# Patient Record
Sex: FEMALE | Race: WHITE | NOT HISPANIC OR LATINO | URBAN - METROPOLITAN AREA
[De-identification: names, ages, dates, MRNs, and addresses within clinical notes are randomized per-mention and may not be internally consistent; named-entity substitution may affect disease eponyms.]

---

## 2017-02-27 DIAGNOSIS — G31.01 PRIMARY PROGRESSIVE APHASIA (HCC): ICD-10-CM

## 2017-02-27 DIAGNOSIS — F02.80 PRIMARY PROGRESSIVE APHASIA (HCC): ICD-10-CM

## 2017-02-27 RX ORDER — MEMANTINE HYDROCHLORIDE 28 MG/1
28 CAPSULE, EXTENDED RELEASE ORAL DAILY
Qty: 90 CAP | Refills: 3 | Status: SHIPPED | OUTPATIENT
Start: 2017-02-27 | End: 2017-04-20 | Stop reason: SDUPTHER

## 2017-04-20 ENCOUNTER — OFFICE VISIT (OUTPATIENT)
Dept: NEUROLOGY | Facility: MEDICAL CENTER | Age: 67
End: 2017-04-20
Payer: COMMERCIAL

## 2017-04-20 VITALS
DIASTOLIC BLOOD PRESSURE: 82 MMHG | HEIGHT: 65 IN | WEIGHT: 198 LBS | HEART RATE: 83 BPM | SYSTOLIC BLOOD PRESSURE: 130 MMHG | TEMPERATURE: 96.6 F | BODY MASS INDEX: 32.99 KG/M2 | OXYGEN SATURATION: 98 % | RESPIRATION RATE: 16 BRPM

## 2017-04-20 DIAGNOSIS — F02.80 PRIMARY PROGRESSIVE APHASIA (HCC): ICD-10-CM

## 2017-04-20 DIAGNOSIS — G31.01 PRIMARY PROGRESSIVE APHASIA (HCC): ICD-10-CM

## 2017-04-20 PROCEDURE — 99213 OFFICE O/P EST LOW 20 MIN: CPT | Performed by: PSYCHIATRY & NEUROLOGY

## 2017-04-20 RX ORDER — MEMANTINE HYDROCHLORIDE 28 MG/1
28 CAPSULE, EXTENDED RELEASE ORAL DAILY
Qty: 90 CAP | Refills: 3 | Status: SHIPPED | OUTPATIENT
Start: 2017-04-20 | End: 2023-09-12 | Stop reason: SDUPTHER

## 2017-04-20 RX ORDER — DULOXETIN HYDROCHLORIDE 60 MG/1
60 CAPSULE, DELAYED RELEASE ORAL DAILY
Qty: 90 CAP | Refills: 3 | Status: SHIPPED | OUTPATIENT
Start: 2017-04-20 | End: 2023-09-12

## 2017-04-20 RX ORDER — DONEPEZIL HYDROCHLORIDE 23 MG/1
1 TABLET, FILM COATED ORAL DAILY
Qty: 90 TAB | Refills: 3 | Status: SHIPPED | OUTPATIENT
Start: 2017-04-20 | End: 2023-09-12

## 2017-04-20 NOTE — MR AVS SNAPSHOT
"Mindy Carvajal   2017 10:00 AM   Office Visit   MRN: [id number]    Department:  [facility]   Dept Phone:  [phone]    Description:  Female : 1950   Provider:  Tulio Gold M.D.           Reason for Visit     Follow-Up aphasia      Allergies as of 2017     No Known Allergies      You were diagnosed with     Primary progressive aphasia   [[id number]]         Vital Signs     Blood Pressure Pulse Temperature Respirations Height Weight    130/82 mmHg 83 35.9 °C (96.6 °F) 16 1.651 m (5' 5\") 89.812 kg (198 lb)    Body Mass Index Oxygen Saturation Smoking Status             32.95 kg/m2 98% Former Smoker         Basic Information     Date Of Birth Sex Race Ethnicity Preferred Language    1950 Female White Non- English      Problem List              ICD-10-CM Priority Class Noted - Resolved    Primary progressive aphasia G31.01, F02.80   Unknown - Present      Health Maintenance        Date Due Completion Dates    IMM DTaP/Tdap/Td Vaccine (1 - Tdap) 1971 ---    PAP SMEAR 1973 ---    COLONOSCOPY 2002 ---    IMM ZOSTER VACCINE 2012 ---    MAMMOGRAM 3/1/2013 3/1/2012, 2011, 2011, 2008, 2008            Current Immunizations     No immunizations on file.      Below and/or attached are the medications your provider expects you to take. Review all of your home medications and newly ordered medications with your provider and/or pharmacist. Follow medication instructions as directed by your provider and/or pharmacist. Please keep your medication list with you and share with your provider. Update the information when medications are discontinued, doses are changed, or new medications (including over-the-counter products) are added; and carry medication information at all times in the event of emergency situations     Allergies:  No Known Allergies          Medications  Valid as of: 2017 - 10:13 AM    Generic Name Brand Name " Tablet Size Instructions for use    Cholecalciferol (Tab) cholecalciferol 1000 UNIT Take 1,000 Units by mouth every day.        Donepezil HCl (Tab) Donepezil HCl 23 MG Take 1 Tab by mouth every day.        DULoxetine HCl (Cap DR Particles) CYMBALTA 60 MG Take 1 Cap by mouth every day.        L-Methylfolate-L49-[id number]   Take  by mouth.        Lovastatin (Tab) MEVACOR 40 MG Take 1 Tab by mouth every evening.        Memantine HCl (CAPSULE SR 24 HR) NAMENDA 28 MG Take 1 Cap by mouth every day.        Simvastatin (Tab) ZOCOR 40 MG Take 40 mg by mouth every evening.        Thyroid   Take 1 g by mouth.        .                 Medicines prescribed today were sent to:     [id number], [place] - [address] [address]    Phone: [phone] Fax: [phone]    Open 24 Hours?: No      Medication refill instructions:       If your prescription bottle indicates you have medication refills left, it is not necessary to call your provider’s office. Please contact your pharmacy and they will refill your medication.    If your prescription bottle indicates you do not have any refills left, you may request refills at any time through one of the following ways: The online [employer] system (except Urgent Care), by calling your provider’s office, or by asking your pharmacy to contact your provider’s office with a refill request. Medication refills are processed only during regular business hours and may not be available until the next business day. Your provider may request additional information or to have a follow-up visit with you prior to refilling your medication.   *Please Note: Medication refills are assigned a new Rx number when refilled electronically. Your pharmacy may indicate that no refills were authorized even though a new prescription for the same medication is available at the pharmacy. Please request the medicine by name with the pharmacy before contacting your provider  for a refill.           [employer] Access Code: [email or web address]  Expires: 5/20/2017 10:13 AM    Your email address is not on file at [facility].  Email Addresses are required for you to sign up for [employer], please contact [phone] to verify your personal information and to provide your email address prior to attempting to register for [employer].    Mindy Carvajal  [address] [place] [address]    [employer]  A secure, online tool to manage your health information     [facility]’s [employer]® is a secure, online tool that connects you to your personalized health information from the privacy of your home -- day or night - making it very easy for you to manage your healthcare. Once the activation process is completed, you can even access your medical information using the [employer] lindsey, which is available for free in the Apple Lindsey store or Google Play store.     To learn more about [employer], visit [email or web address]    There are two levels of access available (as shown below):   My Chart Features  [facility] Primary Care Doctor [facility]  Specialists [facility]  Urgent  Care Non-[facility] Primary Care Doctor   Email your healthcare team securely and privately 24/7 X X X    Manage appointments: schedule your next appointment; view details of past/upcoming appointments X      Request prescription refills. X      View recent personal medical records, including lab and immunizations X X X X   View health record, including health history, allergies, medications X X X X   Read reports about your outpatient visits, procedures, consult and ER notes X X X X   See your discharge summary, which is a recap of your hospital and/or ER visit that includes your diagnosis, lab results, and care plan X X  X     How to register for [employer]:  Once your e-mail address has been verified, follow the following steps to sign up for [employer].     1. Go to  [email or web address]  2. Click on the Sign Up Now box, which takes you to the New  Member Sign Up page. You will need to provide the following information:  a. Enter your [facility] Access Code exactly as it appears at the top of this page. (You will not need to use this code after you’ve completed the sign-up process. If you do not sign up before the expiration date, you must request a new code.)   b. Enter your date of birth.   c. Enter your home email address.   d. Click Submit, and follow the next screen’s instructions.  3. Create a [employer] ID. This will be your [facility] login ID and cannot be changed, so think of one that is secure and easy to remember.  4. Create a [facility] password. You can change your password at any time.  5. Enter your Password Reset Question and Answer. This can be used at a later time if you forget your password.   6. Enter your e-mail address. This allows you to receive e-mail notifications when new information is available in [facility].  7. Click Sign Up. You can now view your health information.    For assistance activating your [facility] account, call [phone]

## 2017-04-20 NOTE — PROGRESS NOTES
"Subjective:      Mindy Carvajal is a 66 y.o. female who presents with her  Andry, who provides the history, with a history of primary progressive aphasia.    HPI    Mindy has continued to do well. The language deficits again limit her ability to interact with others, but she still is very pleasant in severe and demeanor, there are no inappropriate behaviors demonstrated. She remains independent with her ADLs for the most part, there can be some assistance required from Andry. Walking has not been affected, she does not fall, bowel and bladder functions are maintained. She's had no episodes of loss of consciousness or seizure activity. She is sleeping well. Appetite and weight are stable. Speech output is still limited, a lot of paraphasic errors are used, she seemed to get easily flustered, but when more relaxed, she actually does fairly well.    Medical, surgical and family histories are reviewed, there are no new drug allergies. She did finally find a PCP, her Synthroid was renewed, unfortunately they're having a bit of a vilchis with a pulmonary specialist for her CPAP durable medical fixtures. I told Andry he needs to follow-up with his primary care physician in this regard.    She is on Cymbalta 60 mg daily, Aricept 23 mg daily and Namenda XR 20 mg daily, Zocor, vitamin D and calcium supplements and a B complex.    Review of Systems   Unable to perform ROS: dementia        Objective:     /82 mmHg  Pulse 83  Temp(Src) 35.9 °C (96.6 °F)  Resp 16  Ht 1.651 m (5' 5\")  Wt 89.812 kg (198 lb)  BMI 32.95 kg/m2  SpO2 98%     Physical Exam    She appears no acute distress. Her vital signs are stable. There is no malar rash. The neck is supple. Cardiac evaluation reveals a regular rhythm. The mixed aphasia is unchanged, speech production more severely curtailed though both verbal and visual comprehension are also impacted. Paraphasic errors again are used, but she can name simple objects " when pointed to them by the examiner. Interestingly, repetition of written sentences seems to be a little easier than repeating spoken words. There is no apraxia with automatic tasks such as brushing her teeth, but she has difficulty following the examiner's request to perform finger-to-nose testing. There is no inattention. Otherwise, cranial nerve, motor, coordination, and sensory evaluations are otherwise fully intact.     Assessment/Plan:     1. Primary progressive aphasia  We will continue her present regimen unchanged, Andry thinks there may be some slight progression, she certainly seems to be doing well today when compared to her previous evaluation 6 months ago. She certainly has not deteriorated remarkably. I considered the follow-up with her in one year. Andry understands I will be writing for the Namenda, Aricept and Cymbalta for the immediate future, all other medications and DME for her breathing apparatus need to come from her PCPs office.    - Memantine HCl ER (NAMENDA XR) 28 MG CAPSULE SR 24 HR; Take 1 Cap by mouth every day.  Dispense: 90 Cap; Refill: 3  - Donepezil HCl (ARICEPT) 23 MG Tab; Take 1 Tab by mouth every day.  Dispense: 90 Tab; Refill: 3  - duloxetine (CYMBALTA) 60 MG Cap DR Particles delayed-release capsule; Take 1 Cap by mouth every day.  Dispense: 90 Cap; Refill: 3    Time: Evaluation of 20 minutes for exam come review, discussion, and education  Discussion: As mentioned in the assessment, over 50% of the time spent face-to-face counseling and correlating care

## 2017-05-17 ENCOUNTER — HOSPITAL ENCOUNTER (OUTPATIENT)
Dept: LAB | Facility: MEDICAL CENTER | Age: 67
End: 2017-05-17
Attending: NURSE PRACTITIONER
Payer: COMMERCIAL

## 2017-05-17 LAB
EST. AVERAGE GLUCOSE BLD GHB EST-MCNC: 140 MG/DL
HBA1C MFR BLD: 6.5 % (ref 0–5.6)

## 2017-05-17 PROCEDURE — 83036 HEMOGLOBIN GLYCOSYLATED A1C: CPT

## 2017-05-17 PROCEDURE — 36415 COLL VENOUS BLD VENIPUNCTURE: CPT

## 2017-05-20 ENCOUNTER — HOSPITAL ENCOUNTER (OUTPATIENT)
Dept: LAB | Facility: MEDICAL CENTER | Age: 67
End: 2017-05-20
Attending: FAMILY MEDICINE
Payer: COMMERCIAL

## 2017-05-20 LAB
ALBUMIN SERPL BCP-MCNC: 4.3 G/DL (ref 3.2–4.9)
ALBUMIN/GLOB SERPL: 1.4 G/DL
ALP SERPL-CCNC: 70 U/L (ref 30–99)
ALT SERPL-CCNC: 32 U/L (ref 2–50)
ANION GAP SERPL CALC-SCNC: 10 MMOL/L (ref 0–11.9)
APPEARANCE UR: CLEAR
AST SERPL-CCNC: 22 U/L (ref 12–45)
BILIRUB SERPL-MCNC: 0.5 MG/DL (ref 0.1–1.5)
BILIRUB UR QL STRIP.AUTO: NEGATIVE
BUN SERPL-MCNC: 13 MG/DL (ref 8–22)
CALCIUM SERPL-MCNC: 9.5 MG/DL (ref 8.5–10.5)
CHLORIDE SERPL-SCNC: 104 MMOL/L (ref 96–112)
CO2 SERPL-SCNC: 26 MMOL/L (ref 20–33)
COLOR UR: YELLOW
CREAT SERPL-MCNC: 0.86 MG/DL (ref 0.5–1.4)
GFR SERPL CREATININE-BSD FRML MDRD: >60 ML/MIN/1.73 M 2
GLOBULIN SER CALC-MCNC: 3 G/DL (ref 1.9–3.5)
GLUCOSE SERPL-MCNC: 95 MG/DL (ref 65–99)
GLUCOSE UR STRIP.AUTO-MCNC: NEGATIVE MG/DL
KETONES UR STRIP.AUTO-MCNC: NEGATIVE MG/DL
LEUKOCYTE ESTERASE UR QL STRIP.AUTO: NEGATIVE
MICRO URNS: NORMAL
NITRITE UR QL STRIP.AUTO: NEGATIVE
PH UR STRIP.AUTO: 6 [PH]
POTASSIUM SERPL-SCNC: 4.3 MMOL/L (ref 3.6–5.5)
PROT SERPL-MCNC: 7.3 G/DL (ref 6–8.2)
PROT UR QL STRIP: NEGATIVE MG/DL
RBC UR QL AUTO: NEGATIVE
SODIUM SERPL-SCNC: 140 MMOL/L (ref 135–145)
SP GR UR STRIP.AUTO: 1.01
TSH SERPL DL<=0.005 MIU/L-ACNC: 0.38 UIU/ML (ref 0.3–3.7)

## 2017-05-20 PROCEDURE — 80053 COMPREHEN METABOLIC PANEL: CPT

## 2017-05-20 PROCEDURE — 84443 ASSAY THYROID STIM HORMONE: CPT

## 2017-05-20 PROCEDURE — 81003 URINALYSIS AUTO W/O SCOPE: CPT

## 2017-05-20 PROCEDURE — 36415 COLL VENOUS BLD VENIPUNCTURE: CPT

## 2018-06-30 ENCOUNTER — HOSPITAL ENCOUNTER (OUTPATIENT)
Dept: RADIOLOGY | Facility: MEDICAL CENTER | Age: 68
End: 2018-06-30
Attending: FAMILY MEDICINE
Payer: COMMERCIAL

## 2018-06-30 DIAGNOSIS — Z12.31 VISIT FOR SCREENING MAMMOGRAM: ICD-10-CM

## 2018-06-30 PROCEDURE — 77067 SCR MAMMO BI INCL CAD: CPT

## 2018-07-02 ENCOUNTER — HOSPITAL ENCOUNTER (OUTPATIENT)
Dept: LAB | Facility: MEDICAL CENTER | Age: 68
End: 2018-07-02
Attending: FAMILY MEDICINE
Payer: COMMERCIAL

## 2018-07-02 LAB
ALBUMIN SERPL BCP-MCNC: 4.5 G/DL (ref 3.2–4.9)
ALBUMIN/GLOB SERPL: 1.4 G/DL
ALP SERPL-CCNC: 67 U/L (ref 30–99)
ALT SERPL-CCNC: 40 U/L (ref 2–50)
ANION GAP SERPL CALC-SCNC: 11 MMOL/L (ref 0–11.9)
AST SERPL-CCNC: 27 U/L (ref 12–45)
BILIRUB SERPL-MCNC: 0.5 MG/DL (ref 0.1–1.5)
BUN SERPL-MCNC: 16 MG/DL (ref 8–22)
CALCIUM SERPL-MCNC: 9.8 MG/DL (ref 8.5–10.5)
CHLORIDE SERPL-SCNC: 106 MMOL/L (ref 96–112)
CHOLEST SERPL-MCNC: 238 MG/DL (ref 100–199)
CO2 SERPL-SCNC: 24 MMOL/L (ref 20–33)
CREAT SERPL-MCNC: 1.05 MG/DL (ref 0.5–1.4)
GLOBULIN SER CALC-MCNC: 3.2 G/DL (ref 1.9–3.5)
GLUCOSE SERPL-MCNC: 126 MG/DL (ref 65–99)
HDLC SERPL-MCNC: 58 MG/DL
LDLC SERPL CALC-MCNC: ABNORMAL MG/DL
POTASSIUM SERPL-SCNC: 4.1 MMOL/L (ref 3.6–5.5)
PROT SERPL-MCNC: 7.7 G/DL (ref 6–8.2)
SODIUM SERPL-SCNC: 141 MMOL/L (ref 135–145)
T4 SERPL-MCNC: 4.6 UG/DL (ref 4–12)
TRIGL SERPL-MCNC: 440 MG/DL (ref 0–149)
TSH SERPL DL<=0.005 MIU/L-ACNC: 0.66 UIU/ML (ref 0.38–5.33)

## 2018-07-02 PROCEDURE — 80053 COMPREHEN METABOLIC PANEL: CPT

## 2018-07-02 PROCEDURE — 84436 ASSAY OF TOTAL THYROXINE: CPT

## 2018-07-02 PROCEDURE — 80061 LIPID PANEL: CPT

## 2018-07-02 PROCEDURE — 36415 COLL VENOUS BLD VENIPUNCTURE: CPT

## 2018-07-02 PROCEDURE — 84443 ASSAY THYROID STIM HORMONE: CPT

## 2018-10-13 ENCOUNTER — HOSPITAL ENCOUNTER (OUTPATIENT)
Dept: LAB | Facility: MEDICAL CENTER | Age: 68
End: 2018-10-13
Attending: FAMILY MEDICINE
Payer: COMMERCIAL

## 2018-10-13 LAB
CHOLEST SERPL-MCNC: 186 MG/DL (ref 100–199)
FASTING STATUS PATIENT QL REPORTED: NORMAL
HDLC SERPL-MCNC: 58 MG/DL
LDLC SERPL CALC-MCNC: 68 MG/DL
TRIGL SERPL-MCNC: 300 MG/DL (ref 0–149)

## 2018-10-13 PROCEDURE — 36415 COLL VENOUS BLD VENIPUNCTURE: CPT

## 2018-10-13 PROCEDURE — 80061 LIPID PANEL: CPT

## 2019-10-11 ENCOUNTER — HOSPITAL ENCOUNTER (OUTPATIENT)
Facility: MEDICAL CENTER | Age: 69
End: 2019-10-11
Attending: FAMILY MEDICINE
Payer: MEDICARE

## 2019-10-11 ENCOUNTER — HOSPITAL ENCOUNTER (OUTPATIENT)
Dept: LAB | Facility: MEDICAL CENTER | Age: 69
End: 2019-10-11
Attending: FAMILY MEDICINE
Payer: MEDICARE

## 2019-10-11 LAB
APPEARANCE UR: CLEAR
BACTERIA #/AREA URNS HPF: ABNORMAL /HPF
BILIRUB UR QL STRIP.AUTO: NEGATIVE
CAOX CRY #/AREA URNS HPF: ABNORMAL /HPF
COLOR UR: YELLOW
EPI CELLS #/AREA URNS HPF: ABNORMAL /HPF
GLUCOSE UR STRIP.AUTO-MCNC: NEGATIVE MG/DL
HYALINE CASTS #/AREA URNS LPF: ABNORMAL /LPF
KETONES UR STRIP.AUTO-MCNC: NEGATIVE MG/DL
LEUKOCYTE ESTERASE UR QL STRIP.AUTO: ABNORMAL
MICRO URNS: ABNORMAL
MUCOUS THREADS #/AREA URNS HPF: ABNORMAL /HPF
NITRITE UR QL STRIP.AUTO: POSITIVE
PH UR STRIP.AUTO: 5 [PH] (ref 5–8)
PROT UR QL STRIP: NEGATIVE MG/DL
RBC # URNS HPF: ABNORMAL /HPF
RBC UR QL AUTO: NEGATIVE
SP GR UR STRIP.AUTO: 1.02
UROBILINOGEN UR STRIP.AUTO-MCNC: 0.2 MG/DL
WBC #/AREA URNS HPF: ABNORMAL /HPF

## 2019-10-11 PROCEDURE — 81001 URINALYSIS AUTO W/SCOPE: CPT

## 2019-11-19 PROBLEM — Z23 NEED FOR INFLUENZA VACCINATION: Status: ACTIVE | Noted: 2019-11-19

## 2019-11-19 PROBLEM — G47.33 OSA (OBSTRUCTIVE SLEEP APNEA): Status: ACTIVE | Noted: 2019-11-19

## 2019-11-19 PROBLEM — Z87.891 FORMER SMOKER: Status: ACTIVE | Noted: 2019-11-19

## 2019-11-19 NOTE — PROGRESS NOTES
"CC: Establish care for previously diagnosed sleep apnea: \"Authorization to adjust CPAP\"    HPI:    Ms. Mindy Carvajal is a 69-year-old kindly referred by Neema Mora, PhD., A.P.N. for evaluation of obstructive sleep apnea syndrome.  She was originally diagnosed with Dr. Solorzano in 2013 and was subsequently seen by [facility] on AutoPap 8 to 14 cm water  Her AHI was 10.5 with a lulu saturation of 86%.  She has not been able to use her CPAP because of the perception of excessive pressure.  She is accompanied by her  who supplies the history as she has primary progressive aphasia she has not used her machine in 6 months or so.  \"CPAP was too hard, makes it hard to exhale\".    Her total Belden score is a normal for out of 24.    Her last sleep study was done 7/23/2014.    She generally goes to bed at 8 PM and gets up at 7 AM.  She may watch TV just prior to turn out the lights and attempting to go to sleep.  Her sleep latency is about 20 minutes.    Symptoms include sometimes difficulty awakening and getting out of bed after sleeping and napping or returning to bed after arising.  She snores loudly according to her .  Her  is noted leg or arm jerking or twitching occasionally during sleep.  He has not noticed other significant issues    Significant comorbidities and modifying factors include primary progressive aphasia, hypothyroidism, dyslipidemia, former smoker, and up-to-date with influenza vaccination.    Patient Active Problem List    Diagnosis Date Noted   • EM (obstructive sleep apnea) 11/19/2019   • Former smoker 11/19/2019   • Need for influenza vaccination 11/19/2019   • Primary progressive aphasia (HCC)        Past Medical History:   Diagnosis Date   • Dyslipidemia    • Hypothyroid    • Primary progressive aphasia (HCC)    • Sleep apnea         Past Surgical History:   Procedure Laterality Date   • PRIMARY C SECTION         Family History   Problem Relation Age of Onset   • Cancer " Mother         leukemia   • Cancer Father         colon, age 80       Social History     Socioeconomic History   • Marital status:      Spouse name: Not on file   • Number of children: Not on file   • Years of education: Not on file   • Highest education level: Not on file   Occupational History   • Not on file   Social Needs   • Financial resource strain: Not on file   • Food insecurity:     Worry: Not on file     Inability: Not on file   • Transportation needs:     Medical: Not on file     Non-medical: Not on file   Tobacco Use   • Smoking status: Never Smoker   • Smokeless tobacco: Never Used   • Tobacco comment: 30 years ago   Substance and Sexual Activity   • Alcohol use: Yes     Alcohol/week: 0.0 oz     Comment: occasionally   • Drug use: No   • Sexual activity: Not Currently     Partners: Male     Comment: , 1 child, teacher   Lifestyle   • Physical activity:     Days per week: Not on file     Minutes per session: Not on file   • Stress: Not on file   Relationships   • Social connections:     Talks on phone: Not on file     Gets together: Not on file     Attends Voodoo service: Not on file     Active member of club or organization: Not on file     Attends meetings of clubs or organizations: Not on file     Relationship status: Not on file   • Intimate partner violence:     Fear of current or ex partner: Not on file     Emotionally abused: Not on file     Physically abused: Not on file     Forced sexual activity: Not on file   Other Topics Concern   • Not on file   Social History Narrative   • Not on file       Current Outpatient Medications   Medication Sig Dispense Refill   • donepezil (ARICEPT) 10 MG tablet Take 23 mg by mouth.     • QUEtiapine (SEROQUEL) 25 MG Tab Take 25 mg by mouth 2 times a day.     • mirtazapine (REMERON) 30 MG Tab tablet Take 30 mg by mouth every evening.     • duloxetine (CYMBALTA) 60 MG Cap DR Particles delayed-release capsule Take 1 Cap by mouth every day. 90 Cap 3  "  • THYROID PO Take 60 mg by mouth. ARMOUR THYROID     • L-Methylfolate-E61-H8-V6 (CEREFOLIN PO) Take  by mouth.     • simvastatin (ZOCOR) 40 MG Tab Take 40 mg by mouth every evening.     • DULoxetine (CYMBALTA) 30 MG Cap DR Particles Take 30 mg by mouth.     • Memantine HCl ER (NAMENDA XR) 28 MG CAPSULE SR 24 HR Take 1 Cap by mouth every day. 90 Cap 3   • Donepezil HCl (ARICEPT) 23 MG Tab Take 1 Tab by mouth every day. (Patient not taking: Reported on 11/20/2019) 90 Tab 3   • vitamin D (CHOLECALCIFEROL) 1000 UNIT Tab Take 1,000 Units by mouth every day.     • lovastatin (MEVACOR) 40 MG tablet Take 1 Tab by mouth every evening. (Patient not taking: Reported on 10/20/2016) 30 Each 3     No current facility-administered medications for this visit.     \"CURRENT RX\"    ALLERGIES: Patient has no known allergies.    ROS    Constitutional: Denies fever, chills, sweats,  weight loss, fatigue.  Eyes: Denies vision loss, pain, drainage, double vision, wears glasses.  Ears/Nose/Mouth/Throat: Denies earache, difficulty hearing, rhinitis/nasal congestion, injury, recurrent sore throat, persistent hoarseness, decayed teeth/toothaches, ringing or buzzing in the ears.  Cardiovascular: Denies chest pain, tightness, palpitations, swelling in legs/feet, fainting, difficulty breathing when lying down but gets better when sitting up.   Respiratory: Denies shortness of breath, cough, sputum, wheezing, painful breathing, coughing up blood.   Sleep: per HPI  Gastrointestinal: Denies  difficulty swallowing, nausea, abdominal pain, diarrhea, constipation, heartburn.  Genitourinary: Denies  blood in urine, discharge, frequent urination.   Musculoskeletal: Denies painful joints, sore muscles, back pain.   Integumentary: Denies rashes, lumps, color changes.   Neurological: Denies frequent headaches,weakness, dizziness.    PHYSICAL EXAM  Obese    /88 (BP Location: Left arm, Patient Position: Sitting, BP Cuff Size: Large adult)   Pulse 96  " " Resp 16   Ht 1.702 m (5' 7\")   Wt 98.4 kg (217 lb)   SpO2 95%   BMI 33.99 kg/m²   Appearance: Well-nourished, well-developed, no acute distress  Eyes:  PERRLA, EOMI  ENMT: without lesions, deformities;hearing grossly intact; tongue normal, posterior pharynx without erythema or exudate;   Modified Mallampati (AKA Szymanski) Score: 4  Neck: Supple, trachea midline, no masses  Respiratory effort:  No intercostal retractions or use of accessory muscles  Lung auscultation:  No wheezes rhonchi rubs or rales  Cardiac: No murmurs, rubs, or gallops; regular rhythm, normal rate; no edema  Abdomen:  No tenderness, no organomegaly.  Obese.  Musculoskeletal:  Grossly normal; gait and station normal; digits and nails normal  Skin:  No rashes, petechiae, cyanosis  Neurologic: limited speech  Psychiatric:  No depression, anxiety, agitation        PROBLEMS:  1. EM (obstructive sleep apnea)    - Overnight Home Sleep Study; Future    2. Primary progressive aphasia (HCC)      3. Former smoker      4. Need for influenza vaccination      5. Class 2 obesity with body mass index (BMI) of 36.0 to 36.9 in adult, unspecified obesity type, unspecified whether serious comorbidity present    - OBESITY COUNSELING (No Charge): Patient identified as having weight management issue.  Appropriate orders and counseling given.      PLAN:   Her  feels that the machine is giving her unusually high pressures.  Unfortunately the company that provided the unit is no longer in business.  He will bring the machine by for us to check to see if it set on the appropriate pressures.  If so then she has become intolerant to auto titrating CPAP 8 to 14 cm H2O.  Our options at this point would be to empirically place her on a lower auto titrating CPAP or perhaps simply a lower fixed pressure.    We will do a home sleep study off of Pap to reverify and restage her EM.  This will allow us to get her a new machine if she and her  choose to go this " route versus a re-titration versus empiric therapy.  Given the patient's neurologic dysfunction, her  is trying to keep things simple as possible for her.  Therefore they may opt for empiric changes rather than an in lab re-titration.          Return for after sleep study.

## 2019-11-20 ENCOUNTER — SLEEP CENTER VISIT (OUTPATIENT)
Dept: SLEEP MEDICINE | Facility: MEDICAL CENTER | Age: 69
End: 2019-11-20
Payer: MEDICARE

## 2019-11-20 VITALS
DIASTOLIC BLOOD PRESSURE: 88 MMHG | SYSTOLIC BLOOD PRESSURE: 120 MMHG | RESPIRATION RATE: 16 BRPM | OXYGEN SATURATION: 95 % | BODY MASS INDEX: 34.06 KG/M2 | HEIGHT: 67 IN | WEIGHT: 217 LBS | HEART RATE: 96 BPM

## 2019-11-20 DIAGNOSIS — Z87.891 FORMER SMOKER: ICD-10-CM

## 2019-11-20 DIAGNOSIS — F02.80 PRIMARY PROGRESSIVE APHASIA (HCC): ICD-10-CM

## 2019-11-20 DIAGNOSIS — G31.01 PRIMARY PROGRESSIVE APHASIA (HCC): ICD-10-CM

## 2019-11-20 DIAGNOSIS — E66.9 CLASS 2 OBESITY WITH BODY MASS INDEX (BMI) OF 36.0 TO 36.9 IN ADULT, UNSPECIFIED OBESITY TYPE, UNSPECIFIED WHETHER SERIOUS COMORBIDITY PRESENT: ICD-10-CM

## 2019-11-20 DIAGNOSIS — G47.33 OSA (OBSTRUCTIVE SLEEP APNEA): ICD-10-CM

## 2019-11-20 DIAGNOSIS — Z23 NEED FOR INFLUENZA VACCINATION: ICD-10-CM

## 2019-11-20 PROCEDURE — 99204 OFFICE O/P NEW MOD 45 MIN: CPT | Performed by: INTERNAL MEDICINE

## 2019-11-20 RX ORDER — QUETIAPINE FUMARATE 25 MG/1
25 TABLET, FILM COATED ORAL 2 TIMES DAILY
COMMUNITY
End: 2023-09-12 | Stop reason: SDUPTHER

## 2019-11-20 RX ORDER — MIRTAZAPINE 30 MG/1
30 TABLET, FILM COATED ORAL NIGHTLY
COMMUNITY
End: 2023-09-12 | Stop reason: SDUPTHER

## 2019-11-20 RX ORDER — DONEPEZIL HYDROCHLORIDE 10 MG/1
23 TABLET, FILM COATED ORAL
COMMUNITY
End: 2023-09-12

## 2019-11-20 RX ORDER — INFLUENZA A VIRUS A/MICHIGAN/45/2015 X-275 (H1N1) ANTIGEN (FORMALDEHYDE INACTIVATED), INFLUENZA A VIRUS A/SINGAPORE/INFIMH-16-0019/2016 IVR-186 (H3N2) ANTIGEN (FORMALDEHYDE INACTIVATED), AND INFLUENZA B VIRUS B/MARYLAND/15/2016 BX-69A (A B/COLORADO/6/2017-LIKE VIRUS) ANTIGEN (FORMALDEHYDE INACTIVATED) 60; 60; 60 UG/.5ML; UG/.5ML; UG/.5ML
INJECTION, SUSPENSION INTRAMUSCULAR
Refills: 0 | COMMUNITY
Start: 2019-09-29 | End: 2019-11-19

## 2019-11-20 RX ORDER — DULOXETIN HYDROCHLORIDE 30 MG/1
30 CAPSULE, DELAYED RELEASE ORAL
COMMUNITY
End: 2023-09-12

## 2019-11-26 ENCOUNTER — HOSPITAL ENCOUNTER (OUTPATIENT)
Dept: RADIOLOGY | Facility: MEDICAL CENTER | Age: 69
End: 2019-11-26
Attending: CLINICAL NURSE SPECIALIST
Payer: MEDICARE

## 2019-11-26 DIAGNOSIS — R13.10 DYSPHAGIA, UNSPECIFIED TYPE: ICD-10-CM

## 2019-11-26 PROCEDURE — A9270 NON-COVERED ITEM OR SERVICE: HCPCS | Performed by: CLINICAL NURSE SPECIALIST

## 2019-11-26 PROCEDURE — 700112 HCHG RX REV CODE 229: Performed by: CLINICAL NURSE SPECIALIST

## 2019-11-26 PROCEDURE — 74220 X-RAY XM ESOPHAGUS 1CNTRST: CPT

## 2019-11-26 RX ADMIN — ANTACID/ANTIFLATULENT 1 PACKET: 380; 550; 10; 10 GRANULE, EFFERVESCENT ORAL at 13:50

## 2019-12-11 ENCOUNTER — HOME STUDY (OUTPATIENT)
Dept: SLEEP MEDICINE | Facility: MEDICAL CENTER | Age: 69
End: 2019-12-11
Attending: INTERNAL MEDICINE
Payer: MEDICARE

## 2019-12-11 ENCOUNTER — TELEPHONE (OUTPATIENT)
Dept: SLEEP MEDICINE | Facility: MEDICAL CENTER | Age: 69
End: 2019-12-11

## 2019-12-11 DIAGNOSIS — G47.33 OSA (OBSTRUCTIVE SLEEP APNEA): ICD-10-CM

## 2019-12-11 PROCEDURE — G0399 HOME SLEEP TEST/TYPE 3 PORTA: HCPCS | Performed by: INTERNAL MEDICINE

## 2019-12-18 NOTE — TELEPHONE ENCOUNTER
"Per note, \"Her  feels that the machine is giving her unusually high pressures.  Unfortunately the company that provided the unit is no longer in business.  He will bring the machine by for us to check to see if it set on the appropriate pressures.  If so then she has become intolerant to auto titrating CPAP 8 to 14 cm H2O.  Our options at this point would be to empirically place her on a lower auto titrating CPAP or perhaps simply a lower fixed pressure.\"      Are we lowering her pressures? HST was also recently done.  "

## 2019-12-22 NOTE — PROCEDURES
Interpretation:    Ms. Carvajal has a history of sleep apnea hypopnea syndrome but has not been able to tolerate CPAP therapy recently.  This study was designed to restage the severity of her sleep-disordered breathing.  She has a history of progressive aphasia but does not have medical or sleep diagnoses that would contraindicate a home sleep test.    300 minutes of data are available for review and the information appears to be of good quality for analysis.    The respiratory event index is 30.3 events per hour including hypopnea and obstructive apnea episodes.  The lowest arterial oxygen saturation is 81% on room air with an oxygen saturation desaturation index of 28.5 events per hour.  She spends 4% of the study time, or 13 minutes in total, with a saturation less than 90%.  The average heart rate is 80 bpm.  Moderate snoring is noted.    Assessment:  Severe obstructive sleep apnea hypopnea with a respiratory event index of 30.3 events per hour and a lowest arterial oxygen saturation of 81% on room air.    Recommendations:  Airway pressurization, ideally guided by a titration polysomnogram.

## 2020-01-16 NOTE — TELEPHONE ENCOUNTER
LVM for patient about pressure change, also advised her that based on the HST and her current struggles with the CPAP that doing a titration may be warranted and we can try to do that before her next appt.    Waiting for call back.

## 2020-02-12 PROBLEM — E66.9 OBESITY: Status: ACTIVE | Noted: 2020-02-12

## 2020-02-12 NOTE — PROGRESS NOTES
"CC: Home sleep study results    HPI:  Ms. Mindy Carvajal is a 69-year-old kindly referred by Neema Mora, PhD., A.P.N. for evaluation of obstructive sleep apnea syndrome.  She was originally diagnosed with Dr. Solorzano in 2013 and was subsequently seen by [facility] on AutoPap 8 to 14 cm water  Her AHI was 10.5 with a lulu saturation of 86%.  She has not been able to use her CPAP because of the perception of excessive pressure.  She is accompanied by her  who supplies the history as she has primary progressive aphasia she has not used her machine in 6 months or so.  \"CPAP was too hard, makes it hard to exhale\".    From sleep study was performed 12/11/2019 and showed severe obstructive sleep apnea with a respiratory event index of 30.3/lulu saturation 81%.    Significant comorbidities and modifying factors include primary progressive aphasia, hypothyroidism, dyslipidemia, former smoker, and up-to-date with influenza vaccination    Patient Active Problem List    Diagnosis Date Noted   • EM (obstructive sleep apnea) 11/19/2019   • Former smoker 11/19/2019   • Need for influenza vaccination 11/19/2019   • Primary progressive aphasia (HCC)        Past Medical History:   Diagnosis Date   • Dyslipidemia    • Hypothyroid    • Primary progressive aphasia (HCC)    • Sleep apnea         Past Surgical History:   Procedure Laterality Date   • PRIMARY C SECTION         Family History   Problem Relation Age of Onset   • Cancer Mother         leukemia   • Cancer Father         colon, age 80       Social History     Socioeconomic History   • Marital status:      Spouse name: Not on file   • Number of children: Not on file   • Years of education: Not on file   • Highest education level: Not on file   Occupational History   • Not on file   Social Needs   • Financial resource strain: Not on file   • Food insecurity     Worry: Not on file     Inability: Not on file   • Transportation needs     Medical: Not on file     " Non-medical: Not on file   Tobacco Use   • Smoking status: Never Smoker   • Smokeless tobacco: Never Used   • Tobacco comment: 30 years ago   Substance and Sexual Activity   • Alcohol use: Yes     Alcohol/week: 0.0 oz     Comment: occasionally   • Drug use: No   • Sexual activity: Not Currently     Partners: Male     Comment: , 1 child, teacher   Lifestyle   • Physical activity     Days per week: Not on file     Minutes per session: Not on file   • Stress: Not on file   Relationships   • Social connections     Talks on phone: Not on file     Gets together: Not on file     Attends [employer] service: Not on file     Active member of club or organization: Not on file     Attends meetings of clubs or organizations: Not on file     Relationship status: Not on file   • Intimate partner violence     Fear of current or ex partner: Not on file     Emotionally abused: Not on file     Physically abused: Not on file     Forced sexual activity: Not on file   Other Topics Concern   • Not on file   Social History Narrative   • Not on file       Current Outpatient Medications   Medication Sig Dispense Refill   • donepezil (ARICEPT) 10 MG tablet Take 23 mg by mouth.     • DULoxetine (CYMBALTA) 30 MG Cap DR Particles Take 30 mg by mouth.     • QUEtiapine (SEROQUEL) 25 MG Tab Take 25 mg by mouth 2 times a day.     • mirtazapine (REMERON) 30 MG Tab tablet Take 30 mg by mouth every evening.     • Memantine HCl ER (NAMENDA XR) 28 MG CAPSULE SR 24 HR Take 1 Cap by mouth every day. 90 Cap 3   • Donepezil HCl (ARICEPT) 23 MG Tab Take 1 Tab by mouth every day. (Patient not taking: Reported on 11/20/2019) 90 Tab 3   • duloxetine (CYMBALTA) 60 MG Cap DR Particles delayed-release capsule Take 1 Cap by mouth every day. 90 Cap 3   • THYROID PO Take 60 mg by mouth. ARMOUR THYROID     • L-Methylfolate-T78-[id number]-[id number] (CEREFOLIN PO) Take  by mouth.     • simvastatin (ZOCOR) 40 MG Tab Take 40 mg by mouth every evening.     • vitamin D  "(CHOLECALCIFEROL) 1000 UNIT Tab Take 1,000 Units by mouth every day.     • lovastatin (MEVACOR) 40 MG tablet Take 1 Tab by mouth every evening. (Patient not taking: Reported on 10/20/2016) 30 Each 3     No current facility-administered medications for this visit.     \"CURRENT RX\"    ALLERGIES: Patient has no known allergies.    ROS  ***  Constitutional: Denies fever, chills, sweats,  weight loss, fatigue  Cardiovascular: Denies chest pain, tightness, palpitations, swelling in legs/feet, fainting, difficulty breathing when lying down but gets better when sitting up.   Respiratory: Denies shortness of breath, cough, sputum, wheezing, painful breathing, coughing up blood.   Sleep: per HPI  Gastrointestinal: Denies  difficulty swallowing, nausea, abdominal pain, diarrhea, constipation, heartburn.  Musculoskeletal: Denies painful joints, sore muscles, back pain.        PHYSICAL EXAM  ***    There were no vitals taken for this visit.  Appearance: Well-nourished, well-developed, no acute distress  Eyes:  PERRLA, EOMI  ENMT: without lesions, deformities;hearing grossly intact; tongue normal, posterior pharynx without erythema or exudate;   Modified Mallampati (AKA Freidman) Score:  Neck: Supple, trachea midline, no masses  Respiratory effort:  No intercostal retractions or use of accessory muscles  Lung auscultation:  No wheezes rhonchi rubs or rales  Cardiac: No murmurs, rubs, or gallops; regular rhythm, normal rate; no edema  Abdomen:  No tenderness, no organomegaly  Musculoskeletal:  Grossly normal; gait and station normal; digits and nails normal  Skin:  No rashes, petechiae, cyanosis  Neurologic: without focal signs; oriented to person, time, place, and purpose; judgement intact  Psychiatric:  No depression, anxiety, agitation        PROBLEMS:  ***  1. EM (obstructive sleep apnea)  ***    2. Primary progressive aphasia (HCC)  ***    3. Need for influenza vaccination  ***    4. Former smoker  ***      PLAN:     Has " been advised to continue the current ***, clean equipment frequently, and get new mask and supplies as allowed by insurance and DME. Advised about potential problems including nasal obstruction/stuffiness, mask leak or discomfort , frequent awakenings, chill or dryness of the upper airway, noise, inconvenience, and lack of benefit. Recommend an earlier appointment, if significant treatment barriers develop.    Have advised the patient to follow up with the appropriate healthcare practitioners for all other medical problems and issues.    No follow-ups on file.

## 2020-02-14 ENCOUNTER — APPOINTMENT (OUTPATIENT)
Dept: SLEEP MEDICINE | Facility: MEDICAL CENTER | Age: 70
End: 2020-02-14
Payer: MEDICARE

## 2020-05-22 ENCOUNTER — HOSPITAL ENCOUNTER (OUTPATIENT)
Dept: LAB | Facility: MEDICAL CENTER | Age: 70
End: 2020-05-22
Attending: CLINICAL NURSE SPECIALIST
Payer: MEDICARE

## 2020-05-22 LAB
APPEARANCE UR: ABNORMAL
BACTERIA #/AREA URNS HPF: ABNORMAL /HPF
BILIRUB UR QL STRIP.AUTO: NEGATIVE
CAOX CRY #/AREA URNS HPF: ABNORMAL /HPF
COLOR UR: YELLOW
EPI CELLS #/AREA URNS HPF: ABNORMAL /HPF
GLUCOSE UR STRIP.AUTO-MCNC: NEGATIVE MG/DL
HYALINE CASTS #/AREA URNS LPF: ABNORMAL /LPF
KETONES UR STRIP.AUTO-MCNC: NEGATIVE MG/DL
LEUKOCYTE ESTERASE UR QL STRIP.AUTO: ABNORMAL
MICRO URNS: ABNORMAL
NITRITE UR QL STRIP.AUTO: NEGATIVE
PH UR STRIP.AUTO: 5.5 [PH] (ref 5–8)
PROT UR QL STRIP: NEGATIVE MG/DL
RBC # URNS HPF: ABNORMAL /HPF
RBC UR QL AUTO: NEGATIVE
SP GR UR STRIP.AUTO: 1.02
UROBILINOGEN UR STRIP.AUTO-MCNC: 0.2 MG/DL
WBC #/AREA URNS HPF: ABNORMAL /HPF

## 2020-05-22 PROCEDURE — 87186 SC STD MICRODIL/AGAR DIL: CPT

## 2020-05-22 PROCEDURE — 87077 CULTURE AEROBIC IDENTIFY: CPT

## 2020-05-22 PROCEDURE — 87086 URINE CULTURE/COLONY COUNT: CPT

## 2020-05-22 PROCEDURE — 81001 URINALYSIS AUTO W/SCOPE: CPT

## 2020-05-25 LAB
BACTERIA UR CULT: ABNORMAL
BACTERIA UR CULT: ABNORMAL
SIGNIFICANT IND 70042: ABNORMAL
SITE SITE: ABNORMAL
SOURCE SOURCE: ABNORMAL

## 2020-09-17 ENCOUNTER — IMMUNIZATION (OUTPATIENT)
Dept: SOCIAL WORK | Facility: CLINIC | Age: 70
End: 2020-09-17
Payer: MEDICARE

## 2020-09-17 DIAGNOSIS — Z23 NEED FOR VACCINATION: ICD-10-CM

## 2020-09-17 PROCEDURE — G0008 ADMIN INFLUENZA VIRUS VAC: HCPCS | Performed by: REGISTERED NURSE

## 2020-09-17 PROCEDURE — 90662 IIV NO PRSV INCREASED AG IM: CPT | Performed by: REGISTERED NURSE

## 2020-09-29 ENCOUNTER — HOSPITAL ENCOUNTER (OUTPATIENT)
Facility: MEDICAL CENTER | Age: 70
End: 2020-09-29
Attending: CLINICAL NURSE SPECIALIST
Payer: MEDICARE

## 2020-09-29 LAB
APPEARANCE UR: CLEAR
BILIRUB UR QL STRIP.AUTO: NEGATIVE
COLOR UR: NORMAL
GLUCOSE UR STRIP.AUTO-MCNC: NEGATIVE MG/DL
KETONES UR STRIP.AUTO-MCNC: NEGATIVE MG/DL
LEUKOCYTE ESTERASE UR QL STRIP.AUTO: NEGATIVE
MICRO URNS: NORMAL
NITRITE UR QL STRIP.AUTO: NEGATIVE
PH UR STRIP.AUTO: 7 [PH] (ref 5–8)
PROT UR QL STRIP: NEGATIVE MG/DL
RBC UR QL AUTO: NEGATIVE
SP GR UR STRIP.AUTO: 1.02
UROBILINOGEN UR STRIP.AUTO-MCNC: 0.2 MG/DL

## 2020-09-29 PROCEDURE — 81003 URINALYSIS AUTO W/O SCOPE: CPT

## 2021-01-08 ENCOUNTER — HOSPITAL ENCOUNTER (OUTPATIENT)
Dept: LAB | Facility: MEDICAL CENTER | Age: 71
End: 2021-01-08
Attending: PSYCHIATRY & NEUROLOGY
Payer: MEDICARE

## 2021-01-08 LAB
ALBUMIN SERPL BCP-MCNC: 4.2 G/DL (ref 3.2–4.9)
ALBUMIN/GLOB SERPL: 1.5 G/DL
ALP SERPL-CCNC: 77 U/L (ref 30–99)
ALT SERPL-CCNC: 18 U/L (ref 2–50)
ANION GAP SERPL CALC-SCNC: 14 MMOL/L (ref 7–16)
AST SERPL-CCNC: 23 U/L (ref 12–45)
BASOPHILS # BLD AUTO: 0.6 % (ref 0–1.8)
BASOPHILS # BLD: 0.03 K/UL (ref 0–0.12)
BILIRUB SERPL-MCNC: 0.2 MG/DL (ref 0.1–1.5)
BUN SERPL-MCNC: 13 MG/DL (ref 8–22)
CALCIUM SERPL-MCNC: 9.6 MG/DL (ref 8.5–10.5)
CHLORIDE SERPL-SCNC: 98 MMOL/L (ref 96–112)
CO2 SERPL-SCNC: 24 MMOL/L (ref 20–33)
CREAT SERPL-MCNC: 0.8 MG/DL (ref 0.5–1.4)
EOSINOPHIL # BLD AUTO: 0.07 K/UL (ref 0–0.51)
EOSINOPHIL NFR BLD: 1.3 % (ref 0–6.9)
ERYTHROCYTE [DISTWIDTH] IN BLOOD BY AUTOMATED COUNT: 43.6 FL (ref 35.9–50)
FASTING STATUS PATIENT QL REPORTED: NORMAL
FOLATE SERPL-MCNC: >40 NG/ML
GLOBULIN SER CALC-MCNC: 2.8 G/DL (ref 1.9–3.5)
GLUCOSE SERPL-MCNC: 185 MG/DL (ref 65–99)
HCT VFR BLD AUTO: 44.9 % (ref 37–47)
HGB BLD-MCNC: 14.5 G/DL (ref 12–16)
IMM GRANULOCYTES # BLD AUTO: 0.02 K/UL (ref 0–0.11)
IMM GRANULOCYTES NFR BLD AUTO: 0.4 % (ref 0–0.9)
LYMPHOCYTES # BLD AUTO: 2.15 K/UL (ref 1–4.8)
LYMPHOCYTES NFR BLD: 40.2 % (ref 22–41)
MCH RBC QN AUTO: 29.1 PG (ref 27–33)
MCHC RBC AUTO-ENTMCNC: 32.3 G/DL (ref 33.6–35)
MCV RBC AUTO: 90.2 FL (ref 81.4–97.8)
MONOCYTES # BLD AUTO: 0.24 K/UL (ref 0–0.85)
MONOCYTES NFR BLD AUTO: 4.5 % (ref 0–13.4)
NEUTROPHILS # BLD AUTO: 2.84 K/UL (ref 2–7.15)
NEUTROPHILS NFR BLD: 53 % (ref 44–72)
NRBC # BLD AUTO: 0 K/UL
NRBC BLD-RTO: 0 /100 WBC
PLATELET # BLD AUTO: 261 K/UL (ref 164–446)
PMV BLD AUTO: 10.4 FL (ref 9–12.9)
POTASSIUM SERPL-SCNC: 3.7 MMOL/L (ref 3.6–5.5)
PROT SERPL-MCNC: 7 G/DL (ref 6–8.2)
RBC # BLD AUTO: 4.98 M/UL (ref 4.2–5.4)
SODIUM SERPL-SCNC: 136 MMOL/L (ref 135–145)
T3 SERPL-MCNC: 137 NG/DL (ref 60–181)
T4 FREE SERPL-MCNC: 0.67 NG/DL (ref 0.93–1.7)
T4 SERPL-MCNC: 4.6 UG/DL (ref 4–12)
TSH SERPL DL<=0.005 MIU/L-ACNC: 0.63 UIU/ML (ref 0.38–5.33)
VALPROATE SERPL-MCNC: 25.3 UG/ML (ref 50–100)
VIT B12 SERPL-MCNC: 1659 PG/ML (ref 211–911)
WBC # BLD AUTO: 5.4 K/UL (ref 4.8–10.8)

## 2021-01-08 PROCEDURE — 36415 COLL VENOUS BLD VENIPUNCTURE: CPT

## 2021-01-08 PROCEDURE — 84439 ASSAY OF FREE THYROXINE: CPT

## 2021-01-08 PROCEDURE — 82607 VITAMIN B-12: CPT

## 2021-01-08 PROCEDURE — 81401 MOPATH PROCEDURE LEVEL 2: CPT

## 2021-01-08 PROCEDURE — 82746 ASSAY OF FOLIC ACID SERUM: CPT

## 2021-01-08 PROCEDURE — 80164 ASSAY DIPROPYLACETIC ACD TOT: CPT

## 2021-01-08 PROCEDURE — 84480 ASSAY TRIIODOTHYRONINE (T3): CPT

## 2021-01-08 PROCEDURE — 84443 ASSAY THYROID STIM HORMONE: CPT

## 2021-01-08 PROCEDURE — 85025 COMPLETE CBC W/AUTO DIFF WBC: CPT

## 2021-01-08 PROCEDURE — 80053 COMPREHEN METABOLIC PANEL: CPT

## 2021-01-08 PROCEDURE — 81335 TPMT GENE COM VARIANTS: CPT

## 2021-01-15 LAB — TEST NAME 95000: ABNORMAL

## 2021-03-03 DIAGNOSIS — Z23 NEED FOR VACCINATION: ICD-10-CM

## 2021-08-31 ENCOUNTER — HOSPITAL ENCOUNTER (OUTPATIENT)
Dept: LAB | Facility: MEDICAL CENTER | Age: 71
End: 2021-08-31
Attending: FAMILY MEDICINE
Payer: MEDICARE

## 2021-10-06 ENCOUNTER — APPOINTMENT (OUTPATIENT)
Dept: URGENT CARE | Facility: PHYSICIAN GROUP | Age: 71
End: 2021-10-06
Payer: MEDICARE

## 2021-10-08 ENCOUNTER — APPOINTMENT (OUTPATIENT)
Dept: RADIOLOGY | Facility: MEDICAL CENTER | Age: 71
End: 2021-10-08
Attending: FAMILY MEDICINE
Payer: MEDICARE

## 2021-10-08 DIAGNOSIS — M54.50 LOW BACK PAIN, UNSPECIFIED BACK PAIN LATERALITY, UNSPECIFIED CHRONICITY, UNSPECIFIED WHETHER SCIATICA PRESENT: ICD-10-CM

## 2021-10-08 PROCEDURE — 72100 X-RAY EXAM L-S SPINE 2/3 VWS: CPT

## 2022-03-04 ENCOUNTER — HOSPITAL ENCOUNTER (OUTPATIENT)
Facility: MEDICAL CENTER | Age: 72
End: 2022-03-04
Attending: CLINICAL NURSE SPECIALIST
Payer: MEDICARE

## 2022-03-04 PROCEDURE — 87086 URINE CULTURE/COLONY COUNT: CPT

## 2022-03-04 PROCEDURE — 81001 URINALYSIS AUTO W/SCOPE: CPT

## 2022-03-05 LAB
AMORPH CRY #/AREA URNS HPF: PRESENT /HPF
APPEARANCE UR: ABNORMAL
BACTERIA #/AREA URNS HPF: ABNORMAL /HPF
BILIRUB UR QL STRIP.AUTO: NEGATIVE
CAOX CRY #/AREA URNS HPF: ABNORMAL /HPF
COLOR UR: YELLOW
EPI CELLS #/AREA URNS HPF: ABNORMAL /HPF
GLUCOSE UR STRIP.AUTO-MCNC: 100 MG/DL
HYALINE CASTS #/AREA URNS LPF: ABNORMAL /LPF
KETONES UR STRIP.AUTO-MCNC: ABNORMAL MG/DL
LEUKOCYTE ESTERASE UR QL STRIP.AUTO: ABNORMAL
MICRO URNS: ABNORMAL
NITRITE UR QL STRIP.AUTO: NEGATIVE
PH UR STRIP.AUTO: 6 [PH] (ref 5–8)
PROT UR QL STRIP: NEGATIVE MG/DL
RBC # URNS HPF: ABNORMAL /HPF
RBC UR QL AUTO: NEGATIVE
SP GR UR STRIP.AUTO: 1.02
UROBILINOGEN UR STRIP.AUTO-MCNC: 0.2 MG/DL
WBC #/AREA URNS HPF: ABNORMAL /HPF

## 2022-03-07 LAB
BACTERIA UR CULT: NORMAL
SIGNIFICANT IND 70042: NORMAL
SITE SITE: NORMAL
SOURCE SOURCE: NORMAL

## 2023-01-01 ENCOUNTER — HOME CARE VISIT (OUTPATIENT)
Dept: HOSPICE | Facility: HOSPICE | Age: 73
End: 2023-01-01
Payer: MEDICARE

## 2023-01-01 ENCOUNTER — PHARMACY VISIT (OUTPATIENT)
Dept: PHARMACY | Facility: MEDICAL CENTER | Age: 73
End: 2023-01-01
Payer: COMMERCIAL

## 2023-01-01 ENCOUNTER — HOSPICE ADMISSION (OUTPATIENT)
Dept: HOSPICE | Facility: HOSPICE | Age: 73
End: 2023-01-01
Payer: MEDICARE

## 2023-01-01 VITALS — RESPIRATION RATE: 20 BRPM | SYSTOLIC BLOOD PRESSURE: 110 MMHG | DIASTOLIC BLOOD PRESSURE: 80 MMHG | HEART RATE: 80 BPM

## 2023-01-01 VITALS — HEART RATE: 74 BPM | RESPIRATION RATE: 16 BRPM

## 2023-01-01 VITALS — RESPIRATION RATE: 18 BRPM | HEART RATE: 68 BPM

## 2023-01-01 VITALS — HEART RATE: 82 BPM | DIASTOLIC BLOOD PRESSURE: 80 MMHG | RESPIRATION RATE: 20 BRPM | SYSTOLIC BLOOD PRESSURE: 100 MMHG

## 2023-01-01 VITALS — SYSTOLIC BLOOD PRESSURE: 152 MMHG | DIASTOLIC BLOOD PRESSURE: 89 MMHG | HEART RATE: 84 BPM | RESPIRATION RATE: 16 BRPM

## 2023-01-01 VITALS — HEART RATE: 72 BPM | RESPIRATION RATE: 17 BRPM

## 2023-01-01 VITALS — HEART RATE: 70 BPM | RESPIRATION RATE: 16 BRPM

## 2023-01-01 VITALS — HEART RATE: 78 BPM | RESPIRATION RATE: 16 BRPM

## 2023-01-01 VITALS — RESPIRATION RATE: 16 BRPM | HEART RATE: 72 BPM

## 2023-01-01 VITALS — RESPIRATION RATE: 16 BRPM | HEART RATE: 78 BPM

## 2023-01-01 VITALS — HEART RATE: 72 BPM | RESPIRATION RATE: 16 BRPM

## 2023-01-01 VITALS — RESPIRATION RATE: 16 BRPM

## 2023-01-01 DIAGNOSIS — L03.211 CELLULITIS OF FACE: Primary | ICD-10-CM

## 2023-01-01 DIAGNOSIS — G31.1 SENILE DEGENERATION OF BRAIN, NOT ELSEWHERE CLASSIFIED (HCC): Primary | ICD-10-CM

## 2023-01-01 DIAGNOSIS — G31.01 PRIMARY PROGRESSIVE APHASIA (HCC): ICD-10-CM

## 2023-01-01 DIAGNOSIS — Z51.5 HOSPICE CARE: ICD-10-CM

## 2023-01-01 DIAGNOSIS — F02.80 PRIMARY PROGRESSIVE APHASIA (HCC): ICD-10-CM

## 2023-01-01 DIAGNOSIS — G31.1 SENILE DEGENERATION OF BRAIN (HCC): Primary | ICD-10-CM

## 2023-01-01 DIAGNOSIS — G31.1 SENILE DEGENERATION OF BRAIN (HCC): ICD-10-CM

## 2023-01-01 PROCEDURE — S9126 HOSPICE CARE, IN THE HOME, P: HCPCS

## 2023-01-01 PROCEDURE — RXMED WILLOW AMBULATORY MEDICATION CHARGE: Performed by: REHABILITATION PRACTITIONER

## 2023-01-01 PROCEDURE — G0299 HHS/HOSPICE OF RN EA 15 MIN: HCPCS

## 2023-01-01 PROCEDURE — G0156 HHCP-SVS OF AIDE,EA 15 MIN: HCPCS

## 2023-01-01 PROCEDURE — RXMED WILLOW AMBULATORY MEDICATION CHARGE: Performed by: STUDENT IN AN ORGANIZED HEALTH CARE EDUCATION/TRAINING PROGRAM

## 2023-01-01 PROCEDURE — G0155 HHCP-SVS OF CSW,EA 15 MIN: HCPCS

## 2023-01-01 PROCEDURE — 665036 HSPC NOTICE OF ELECTION NOE

## 2023-01-01 RX ORDER — VIT B12/LEVOMEFOLATE/VIT B6/B2 1-6-50-5MG
1 TABLET ORAL DAILY
Qty: 90 TABLET | Refills: 3 | Status: SHIPPED | OUTPATIENT
Start: 2023-01-01 | End: 2023-01-01 | Stop reason: SDUPTHER

## 2023-01-01 RX ORDER — THYROID 60 MG/1
60 TABLET ORAL DAILY
Qty: 14 TABLET | Refills: 10 | Status: SHIPPED | OUTPATIENT
Start: 2023-01-01 | End: 2024-01-01 | Stop reason: SDUPTHER

## 2023-01-01 RX ORDER — VIT B12/LEVOMEFOLATE/VIT B6/B2 1-6-50-5MG
1 TABLET ORAL DAILY
Qty: 30 TABLET | Refills: 3 | Status: SHIPPED | OUTPATIENT
Start: 2023-01-01 | End: 2024-01-01

## 2023-01-01 RX ORDER — BISACODYL 10 MG
10 SUPPOSITORY, RECTAL RECTAL PRN
Qty: 5 SUPPOSITORY | Refills: 10 | Status: SHIPPED | OUTPATIENT
Start: 2023-01-01 | End: 2024-09-11

## 2023-01-01 RX ORDER — CLONAZEPAM 0.5 MG/1
0.5 TABLET ORAL 2 TIMES DAILY
Qty: 360 TABLET | Refills: 0 | Status: SHIPPED | OUTPATIENT
Start: 2023-01-01 | End: 2024-01-01 | Stop reason: SDUPTHER

## 2023-01-01 RX ORDER — TRAZODONE HYDROCHLORIDE 100 MG/1
100 TABLET ORAL NIGHTLY
Qty: 14 TABLET | Refills: 10 | Status: SHIPPED | OUTPATIENT
Start: 2023-01-01 | End: 2024-01-01

## 2023-01-01 RX ORDER — MORPHINE SULFATE 100 MG/5ML
5-10 SOLUTION ORAL
Qty: 240 ML | Refills: 0 | Status: SHIPPED | OUTPATIENT
Start: 2023-01-01 | End: 2024-01-01 | Stop reason: SDUPTHER

## 2023-01-01 RX ORDER — MEMANTINE HYDROCHLORIDE 28 MG/1
28 CAPSULE, EXTENDED RELEASE ORAL DAILY
Qty: 90 CAPSULE | Refills: 3 | Status: SHIPPED | OUTPATIENT
Start: 2023-01-01 | End: 2023-01-01 | Stop reason: SDUPTHER

## 2023-01-01 RX ORDER — MEMANTINE HYDROCHLORIDE 28 MG/1
28 CAPSULE, EXTENDED RELEASE ORAL DAILY
Qty: 30 CAPSULE | Refills: 3 | Status: SHIPPED | OUTPATIENT
Start: 2023-01-01 | End: 2024-01-01 | Stop reason: SDUPTHER

## 2023-01-01 RX ORDER — ESCITALOPRAM OXALATE 10 MG/1
10 TABLET ORAL DAILY
Qty: 14 TABLET | Refills: 10 | Status: SHIPPED | OUTPATIENT
Start: 2023-01-01 | End: 2024-01-01

## 2023-01-01 RX ORDER — QUETIAPINE FUMARATE 25 MG/1
25 TABLET, FILM COATED ORAL 4 TIMES DAILY
Qty: 56 TABLET | Refills: 10 | Status: SHIPPED | OUTPATIENT
Start: 2023-01-01 | End: 2024-01-01

## 2023-01-01 RX ORDER — ACETAMINOPHEN 500 MG
1000 TABLET ORAL EVERY 6 HOURS PRN
Qty: 30 TABLET | Refills: 10 | Status: SHIPPED | OUTPATIENT
Start: 2023-01-01 | End: 2024-01-01

## 2023-01-01 RX ORDER — MIRTAZAPINE 45 MG/1
22.5 TABLET, FILM COATED ORAL 2 TIMES DAILY
Qty: 14 TABLET | Refills: 11 | Status: SHIPPED | OUTPATIENT
Start: 2023-01-01 | End: 2024-01-01

## 2023-01-01 RX ORDER — LORAZEPAM 2 MG/ML
1-2 CONCENTRATE ORAL
Qty: 360 ML | Refills: 0 | Status: SHIPPED | OUTPATIENT
Start: 2023-01-01 | End: 2024-01-01 | Stop reason: SDUPTHER

## 2023-01-01 RX ORDER — ACETAMINOPHEN 650 MG/1
650 SUPPOSITORY RECTAL EVERY 6 HOURS PRN
Qty: 5 SUPPOSITORY | Refills: 10 | Status: SHIPPED | OUTPATIENT
Start: 2023-01-01 | End: 2024-01-01 | Stop reason: SDUPTHER

## 2023-01-01 RX ORDER — SENNA AND DOCUSATE SODIUM 50; 8.6 MG/1; MG/1
2 TABLET, FILM COATED ORAL 2 TIMES DAILY PRN
Qty: 28 TABLET | Refills: 10 | Status: SHIPPED | OUTPATIENT
Start: 2023-01-01 | End: 2024-01-01

## 2023-01-01 RX ORDER — DIVALPROEX SODIUM 125 MG/1
125 CAPSULE, COATED PELLETS ORAL 4 TIMES DAILY
Qty: 56 CAPSULE | Refills: 10 | Status: SHIPPED | OUTPATIENT
Start: 2023-01-01 | End: 2024-01-01

## 2023-01-01 RX ORDER — ONDANSETRON 4 MG/1
4 TABLET, ORALLY DISINTEGRATING ORAL EVERY 6 HOURS PRN
Qty: 15 TABLET | Refills: 10 | Status: SHIPPED | OUTPATIENT
Start: 2023-01-01 | End: 2024-01-01

## 2023-01-01 SDOH — ECONOMIC STABILITY: GENERAL

## 2023-01-01 ASSESSMENT — ENCOUNTER SYMPTOMS
SLEEP QUALITY: ADEQUATE
DESCRIPTION OF MEMORY LOSS: LONG TERM
STOOL FREQUENCY: DAILY
MUSCLE WEAKNESS: 1
FATIGUE: 1
FATIGUES EASILY: 1
FATIGUE: 1
SLEEP QUALITY: ADEQUATE
DESCRIPTION OF MEMORY LOSS: LONG TERM
LIMITED RANGE OF MOTION: 1
LAST BOWEL MOVEMENT: 66828
DENIES PAIN: 1
MUSCLE WEAKNESS: 1
FATIGUES EASILY: 1
FATIGUE: 1
DENIES PAIN: 1
DESCRIPTION OF MEMORY LOSS: IMMEDIATE
LIMITED RANGE OF MOTION: 1
LAST BOWEL MOVEMENT: 66757
LAST BOWEL MOVEMENT: 66771
FATIGUES EASILY: 1
DESCRIPTION OF MEMORY LOSS: LONG TERM
MUSCLE WEAKNESS: 1
FORGETFULNESS: 1
FATIGUES EASILY: 1
FATIGUE: 1
LIMITED RANGE OF MOTION: 1
FATIGUES EASILY: 1
PERSON REPORTING PAIN: CAREGIVER
FORGETFULNESS: 1
LIMITED RANGE OF MOTION: 1
DESCRIPTION OF MEMORY LOSS: LONG TERM
FORGETFULNESS: 1
STOOL FREQUENCY: DAILY
FATIGUES EASILY: 1
PERSON REPORTING PAIN: DIRECT OBSERVATION
MUSCLE WEAKNESS: 1
BOWEL INCONTINENCE: 1
MUSCLE WEAKNESS: 1
SLEEP QUALITY: ADEQUATE
LAST BOWEL MOVEMENT: 66785
FATIGUE: 1
LAST BOWEL MOVEMENT: 66807
DESCRIPTION OF MEMORY LOSS: LONG TERM
SLEEP QUALITY: ADEQUATE
SLEEP QUALITY: ADEQUATE
LIMITED RANGE OF MOTION: 1
DESCRIPTION OF MEMORY LOSS: SHORT TERM
LAST BOWEL MOVEMENT: 66821
MUSCLE WEAKNESS: 1
PERSON REPORTING PAIN: DIRECT OBSERVATION
FATIGUE: 1
SUBJECTIVE PAIN PROGRESSION: UNCHANGED
MUSCLE WEAKNESS: 1
LIMITED RANGE OF MOTION: 1
FATIGUE: 1
MUSCLE WEAKNESS: 1
MUSCLE WEAKNESS: 1
DESCRIPTION OF MEMORY LOSS: SHORT TERM
BOWEL INCONTINENCE: 1
STOOL FREQUENCY: LESS THAN DAILY
FATIGUES EASILY: 1
DENIES PAIN: 1
STOOL FREQUENCY: DAILY
DESCRIPTION OF MEMORY LOSS: SHORT TERM
DESCRIPTION OF MEMORY LOSS: SHORT TERM
DENIES PAIN: 1
STOOL FREQUENCY: LESS THAN DAILY
PAIN SEVERITY GOAL: 0/10
STOOL FREQUENCY: DAILY
DENIES PAIN: 1
SLEEP QUALITY: ADEQUATE
PERSON REPORTING PAIN: FAMILY
DESCRIPTION OF MEMORY LOSS: SHORT TERM
LAST BOWEL MOVEMENT: 66765
BOWEL INCONTINENCE: 1
MUSCLE WEAKNESS: 1
DENIES PAIN: 1
LAST BOWEL MOVEMENT: 66793
DESCRIPTION OF MEMORY LOSS: LONG TERM
HIGHEST PAIN SEVERITY IN PAST 24 HOURS: 2/10
PERSON REPORTING PAIN: DIRECT OBSERVATION
LAST BOWEL MOVEMENT: 66834
BOWEL INCONTINENCE: 1
LAST BOWEL MOVEMENT: 66750
STOOL FREQUENCY: DAILY
DESCRIPTION OF MEMORY LOSS: SHORT TERM
DESCRIPTION OF MEMORY LOSS: LONG TERM
FATIGUE: 1
SLEEP QUALITY: ADEQUATE
FATIGUES EASILY: 1
FORGETFULNESS: 1
SLEEP QUALITY: ADEQUATE
STOOL FREQUENCY: DAILY
LOWEST PAIN SEVERITY IN PAST 24 HOURS: 0/10
BOWEL INCONTINENCE: 1
DENIES PAIN: 1
STOOL FREQUENCY: DAILY
HYPOTENSION: 1
PERSON REPORTING PAIN: PATIENT
FATIGUES EASILY: 1
STOOL FREQUENCY: LESS THAN DAILY
DESCRIPTION OF MEMORY LOSS: SHORT TERM
DENIES PAIN: 1
UNABLE TO COMMUNICATE PAIN: 1
DESCRIPTION OF MEMORY LOSS: SHORT TERM
FATIGUES EASILY: 1
LIMITED RANGE OF MOTION: 1
PERSON REPORTING PAIN: DIRECT OBSERVATION
LIMITED RANGE OF MOTION: 1
FORGETFULNESS: 1
PERSON REPORTING PAIN: DIRECT OBSERVATION
SLEEP QUALITY: ADEQUATE
PERSON REPORTING PAIN: DIRECT OBSERVATION
FATIGUE: 1
STOOL FREQUENCY: LESS THAN DAILY
BOWEL INCONTINENCE: 1
SLEEP QUALITY: ADEQUATE
MUSCLE WEAKNESS: 1
MUSCLE WEAKNESS: 1
DENIES PAIN: 1
PERSON REPORTING PAIN: DIRECT OBSERVATION
MUSCLE WEAKNESS: 1
PERSON REPORTING PAIN: DIRECT OBSERVATION
BOWEL INCONTINENCE: 1
SLEEP QUALITY: ADEQUATE
LAST BOWEL MOVEMENT: 66730
FORGETFULNESS: 1
PERSON REPORTING PAIN: DIRECT OBSERVATION
FATIGUES EASILY: 1
SLEEP QUALITY: ADEQUATE
DENIES PAIN: 1
SLEEP QUALITY: ADEQUATE
STOOL FREQUENCY: DAILY
BOWEL INCONTINENCE: 1
STOOL FREQUENCY: DAILY
FATIGUE: 1
LAST BOWEL MOVEMENT: 66800
FORGETFULNESS: 1
LAST BOWEL MOVEMENT: 66812
PERSON REPORTING PAIN: DIRECT OBSERVATION
DENIES PAIN: 1
MUSCLE WEAKNESS: 1
LAST BOWEL MOVEMENT: 66736
MUSCLE WEAKNESS: 1
FATIGUES EASILY: 1
BOWEL INCONTINENCE: 1
SLEEP QUALITY: ADEQUATE
DENIES PAIN: 1
FORGETFULNESS: 1
FATIGUES EASILY: 1
BOWEL INCONTINENCE: 1
FATIGUE: 1
DENIES PAIN: 1
STOOL FREQUENCY: TWICE DAILY
LAST BOWEL MOVEMENT: 66778
FORGETFULNESS: 1
LAST BOWEL MOVEMENT: 66729
MUSCLE WEAKNESS: 1
AGITATION: 1
SLEEP QUALITY: ADEQUATE
STOOL FREQUENCY: DAILY
PERSON REPORTING PAIN: DIRECT OBSERVATION
STOOL FREQUENCY: DAILY
MUSCLE WEAKNESS: 1
DENIES PAIN: 1
LAST BOWEL MOVEMENT: 66744
FATIGUE: 1

## 2023-01-01 ASSESSMENT — PAIN SCALES - PAIN ASSESSMENT IN ADVANCED DEMENTIA (PAINAD)
TOTALSCORE: 0
CONSOLABILITY: 0 - NO NEED TO CONSOLE.
NEGVOCALIZATION: 0 - NONE.
TOTALSCORE: 0
BODYLANGUAGE: 0 - RELAXED.
BODYLANGUAGE: 0 - RELAXED.
FACIALEXPRESSION: 0 - SMILING OR INEXPRESSIVE.
BODYLANGUAGE: 0 - RELAXED.
CONSOLABILITY: 0 - NO NEED TO CONSOLE.
CONSOLABILITY: 0 - NO NEED TO CONSOLE.
FACIALEXPRESSION: 0 - SMILING OR INEXPRESSIVE.
FACIALEXPRESSION: 0 - SMILING OR INEXPRESSIVE.
CONSOLABILITY: 0 - NO NEED TO CONSOLE.
CONSOLABILITY: 0 - NO NEED TO CONSOLE.
FACIALEXPRESSION: 0 - SMILING OR INEXPRESSIVE.
TOTALSCORE: 0
CONSOLABILITY: 0 - NO NEED TO CONSOLE.
BODYLANGUAGE: 0 - RELAXED.
FACIALEXPRESSION: 0 - SMILING OR INEXPRESSIVE.
BODYLANGUAGE: 0 - RELAXED.
FACIALEXPRESSION: 0 - SMILING OR INEXPRESSIVE.
NEGVOCALIZATION: 0 - NONE.
NEGVOCALIZATION: 0 - NONE.
BODYLANGUAGE: 0 - RELAXED.
TOTALSCORE: 0
CONSOLABILITY: 0 - NO NEED TO CONSOLE.
NEGVOCALIZATION: 0 - NONE.
BODYLANGUAGE: 0 - RELAXED.
CONSOLABILITY: 0 - NO NEED TO CONSOLE.
FACIALEXPRESSION: 0 - SMILING OR INEXPRESSIVE.
CONSOLABILITY: 0 - NO NEED TO CONSOLE.
FACIALEXPRESSION: 0 - SMILING OR INEXPRESSIVE.
NEGVOCALIZATION: 0 - NONE.
NEGVOCALIZATION: 0 - NONE.
BODYLANGUAGE: 0 - RELAXED.
NEGVOCALIZATION: 0 - NONE.
FACIALEXPRESSION: 0 - SMILING OR INEXPRESSIVE.
TOTALSCORE: 0
FACIALEXPRESSION: 0 - SMILING OR INEXPRESSIVE.
TOTALSCORE: 0
BODYLANGUAGE: 0 - RELAXED.
TOTALSCORE: 0
BODYLANGUAGE: 0 - RELAXED.
FACIALEXPRESSION: 0 - SMILING OR INEXPRESSIVE.
NEGVOCALIZATION: 0 - NONE.
FACIALEXPRESSION: 0 - SMILING OR INEXPRESSIVE.
TOTALSCORE: 0
BODYLANGUAGE: 0 - RELAXED.
NEGVOCALIZATION: 0 - NONE.
NEGVOCALIZATION: 0 - NONE.
BODYLANGUAGE: 0 - RELAXED.
TOTALSCORE: 0
FACIALEXPRESSION: 0 - SMILING OR INEXPRESSIVE.
TOTALSCORE: 0
CONSOLABILITY: 0 - NO NEED TO CONSOLE.
NEGVOCALIZATION: 0 - NONE.
TOTALSCORE: 0
CONSOLABILITY: 0 - NO NEED TO CONSOLE.
TOTALSCORE: 0
CONSOLABILITY: 0 - NO NEED TO CONSOLE.
CONSOLABILITY: 0 - NO NEED TO CONSOLE.
NEGVOCALIZATION: 0 - NONE.
TOTALSCORE: 0
TOTALSCORE: 0
BODYLANGUAGE: 0 - RELAXED.
NEGVOCALIZATION: 0 - NONE.
TOTALSCORE: 0
BODYLANGUAGE: 0 - RELAXED.
CONSOLABILITY: 0 - NO NEED TO CONSOLE.
CONSOLABILITY: 0 - NO NEED TO CONSOLE.
FACIALEXPRESSION: 0 - SMILING OR INEXPRESSIVE.
BODYLANGUAGE: 0 - RELAXED.
FACIALEXPRESSION: 0 - SMILING OR INEXPRESSIVE.
NEGVOCALIZATION: 0 - NONE.
NEGVOCALIZATION: 0 - NONE.

## 2023-01-01 ASSESSMENT — ACTIVITIES OF DAILY LIVING (ADL)
CURRENT_FUNCTION: ONE PERSON
MONEY MANAGEMENT (EXPENSES/BILLS): TOTALLY DEPENDENT
AMBULATION ASSISTANCE: ONE PERSON
AMBULATION ASSISTANCE: STAND BY ASSIST
AMBULATION ASSISTANCE: ONE PERSON
CURRENT_FUNCTION: ONE PERSON
AMBULATION ASSISTANCE: STAND BY ASSIST
DRESSING_REQUIRES_ASSISTANCE: 1
CURRENT_FUNCTION: ONE PERSON
CONTINENCE_REQUIRES_ASSISTANCE: 1
AMBULATION ASSISTANCE: ONE PERSON
AMBULATION ASSISTANCE: ONE PERSON
EATING_REQUIRES_ASSISTANCE: 1
CURRENT_FUNCTION: ONE PERSON
AMBULATION ASSISTANCE: ONE PERSON
AMBULATION ASSISTANCE: STAND BY ASSIST
AMBULATION_REQUIRES_ASSISTANCE: 1
CURRENT_FUNCTION: STAND BY ASSIST
CURRENT_FUNCTION: STAND BY ASSIST
BATHING_REQUIRES_ASSISTANCE: 1
CURRENT_FUNCTION: ONE PERSON
CURRENT_FUNCTION: ONE PERSON
AMBULATION ASSISTANCE: STAND BY ASSIST
CURRENT_FUNCTION: ONE PERSON
CURRENT_FUNCTION: STAND BY ASSIST
PHYSICAL_TRANSFER_REQUIRES_ASSISTANCE: 1
AMBULATION ASSISTANCE: ONE PERSON
MONEY MANAGEMENT (EXPENSES/BILLS): TOTALLY DEPENDENT
AMBULATION ASSISTANCE: ONE PERSON
CURRENT_FUNCTION: ONE PERSON
AMBULATION ASSISTANCE: STAND BY ASSIST
CURRENT_FUNCTION: STAND BY ASSIST
CURRENT_FUNCTION: STAND BY ASSIST

## 2023-01-01 ASSESSMENT — SOCIAL DETERMINANTS OF HEALTH (SDOH)
ACTIVE STRESSOR - NO STRESS FACTORS: 1

## 2023-01-01 ASSESSMENT — PATIENT HEALTH QUESTIONNAIRE - PHQ9: CLINICAL INTERPRETATION OF PHQ2 SCORE: 0

## 2023-09-12 NOTE — PROGRESS NOTES
Patient discussed with Melissa Mcfarland RN. Patient admitting to community hosp[facility] for senile degeneration of brain. Patient's spouse requested to continue several non-hospice covered medications including Vitamin D, Memantine, and Cerefolin. Patient has significant anxiety and agitation, spouse states current combination of medications is effective. In addition, added comfort medications for PRN use.

## 2024-01-01 ENCOUNTER — PHARMACY VISIT (OUTPATIENT)
Dept: PHARMACY | Facility: MEDICAL CENTER | Age: 74
End: 2024-01-01
Payer: COMMERCIAL

## 2024-01-01 ENCOUNTER — HOME CARE VISIT (OUTPATIENT)
Dept: HOSPICE | Facility: HOSPICE | Age: 74
End: 2024-01-01
Payer: MEDICARE

## 2024-01-01 ENCOUNTER — PHARMACY VISIT (OUTPATIENT)
Dept: PHARMACY | Facility: MEDICAL CENTER | Age: 74
End: 2024-01-01

## 2024-01-01 ENCOUNTER — HOSPITAL ENCOUNTER (INPATIENT)
Facility: MEDICAL CENTER | Age: 74
LOS: 5 days | DRG: 951 | End: 2024-03-20
Attending: STUDENT IN AN ORGANIZED HEALTH CARE EDUCATION/TRAINING PROGRAM | Admitting: STUDENT IN AN ORGANIZED HEALTH CARE EDUCATION/TRAINING PROGRAM
Payer: COMMERCIAL

## 2024-01-01 VITALS
RESPIRATION RATE: 14 BRPM | SYSTOLIC BLOOD PRESSURE: 142 MMHG | DIASTOLIC BLOOD PRESSURE: 103 MMHG | OXYGEN SATURATION: 90 % | HEIGHT: 67 IN | HEART RATE: 97 BPM | WEIGHT: 169.53 LBS | BODY MASS INDEX: 26.61 KG/M2 | TEMPERATURE: 97.1 F

## 2024-01-01 VITALS — RESPIRATION RATE: 20 BRPM | HEART RATE: 88 BPM

## 2024-01-01 VITALS — HEART RATE: 72 BPM | RESPIRATION RATE: 18 BRPM

## 2024-01-01 VITALS — DIASTOLIC BLOOD PRESSURE: 80 MMHG | RESPIRATION RATE: 18 BRPM | SYSTOLIC BLOOD PRESSURE: 130 MMHG | HEART RATE: 72 BPM

## 2024-01-01 VITALS — HEART RATE: 115 BPM | RESPIRATION RATE: 10 BRPM

## 2024-01-01 VITALS — RESPIRATION RATE: 8 BRPM | HEART RATE: 100 BPM

## 2024-01-01 VITALS — RESPIRATION RATE: 12 BRPM | HEART RATE: 100 BPM

## 2024-01-01 VITALS — HEART RATE: 104 BPM | RESPIRATION RATE: 10 BRPM

## 2024-01-01 VITALS — RESPIRATION RATE: 18 BRPM | HEART RATE: 74 BPM

## 2024-01-01 VITALS — HEART RATE: 72 BPM | RESPIRATION RATE: 16 BRPM

## 2024-01-01 VITALS — RESPIRATION RATE: 16 BRPM | HEART RATE: 72 BPM

## 2024-01-01 VITALS — RESPIRATION RATE: 16 BRPM | HEART RATE: 68 BPM

## 2024-01-01 VITALS — HEART RATE: 66 BPM | RESPIRATION RATE: 16 BRPM

## 2024-01-01 VITALS — RESPIRATION RATE: 16 BRPM | HEART RATE: 74 BPM

## 2024-01-01 VITALS — RESPIRATION RATE: 10 BRPM | HEART RATE: 110 BPM

## 2024-01-01 VITALS — RESPIRATION RATE: 18 BRPM

## 2024-01-01 DIAGNOSIS — G31.1 SENILE DEGENERATION OF BRAIN, NOT ELSEWHERE CLASSIFIED (HCC): ICD-10-CM

## 2024-01-01 DIAGNOSIS — G31.1 SENILE DEGENERATION OF BRAIN (HCC): ICD-10-CM

## 2024-01-01 DIAGNOSIS — G31.1 SENILE DEGENERATION OF BRAIN (HCC): Primary | ICD-10-CM

## 2024-01-01 DIAGNOSIS — Z51.5 HOSPICE CARE: ICD-10-CM

## 2024-01-01 PROCEDURE — A9270 NON-COVERED ITEM OR SERVICE: HCPCS | Performed by: REHABILITATION PRACTITIONER

## 2024-01-01 PROCEDURE — S9126 HOSPICE CARE, IN THE HOME, P: HCPCS

## 2024-01-01 PROCEDURE — T2044 HOSPICE RESPITE CARE: HCPCS

## 2024-01-01 PROCEDURE — G0156 HHCP-SVS OF AIDE,EA 15 MIN: HCPCS

## 2024-01-01 PROCEDURE — G0299 HHS/HOSPICE OF RN EA 15 MIN: HCPCS

## 2024-01-01 PROCEDURE — RXMED WILLOW AMBULATORY MEDICATION CHARGE: Performed by: STUDENT IN AN ORGANIZED HEALTH CARE EDUCATION/TRAINING PROGRAM

## 2024-01-01 PROCEDURE — RXMED WILLOW AMBULATORY MEDICATION CHARGE: Performed by: REHABILITATION PRACTITIONER

## 2024-01-01 PROCEDURE — 700102 HCHG RX REV CODE 250 W/ 637 OVERRIDE(OP): Performed by: REHABILITATION PRACTITIONER

## 2024-01-01 PROCEDURE — 770001 HCHG ROOM/CARE - MED/SURG/GYN PRIV*

## 2024-01-01 PROCEDURE — G0155 HHCP-SVS OF CSW,EA 15 MIN: HCPCS

## 2024-01-01 RX ORDER — CLONAZEPAM 0.5 MG/1
0.5 TABLET ORAL EVERY MORNING
Qty: 180 TABLET | Refills: 0 | Status: SHIPPED | OUTPATIENT
Start: 2024-01-01 | End: 2024-01-01

## 2024-01-01 RX ORDER — LORAZEPAM 2 MG/ML
CONCENTRATE ORAL
Qty: 360 ML | Refills: 0 | Status: SHIPPED | OUTPATIENT
Start: 2024-01-01 | End: 2025-03-21

## 2024-01-01 RX ORDER — THYROID 60 MG/1
60 TABLET ORAL DAILY
Qty: 14 TABLET | Refills: 10 | Status: SHIPPED | OUTPATIENT
Start: 2024-01-01 | End: 2024-01-01

## 2024-01-01 RX ORDER — MORPHINE SULFATE 100 MG/5ML
10 SOLUTION ORAL
Status: DISCONTINUED | OUTPATIENT
Start: 2024-01-01 | End: 2024-01-01 | Stop reason: HOSPADM

## 2024-01-01 RX ORDER — MULTIVIT,THER.W-IRON,HEMATINIC 27MG-0.8MG
TABLET ORAL
Qty: 14 TABLET | Refills: 11 | Status: SHIPPED | OUTPATIENT
Start: 2024-01-01 | End: 2024-01-01

## 2024-01-01 RX ORDER — CARBOXYMETHYLCELLULOSE SODIUM 5 MG/ML
1 SOLUTION/ DROPS OPHTHALMIC PRN
Status: DISCONTINUED | OUTPATIENT
Start: 2024-01-01 | End: 2024-01-01 | Stop reason: HOSPADM

## 2024-01-01 RX ORDER — ACETAMINOPHEN 650 MG/1
650 SUPPOSITORY RECTAL EVERY 6 HOURS PRN
Qty: 5 SUPPOSITORY | Refills: 10 | Status: SHIPPED | OUTPATIENT
Start: 2024-01-01 | End: 2024-01-01

## 2024-01-01 RX ORDER — MEMANTINE HYDROCHLORIDE 28 MG/1
28 CAPSULE, EXTENDED RELEASE ORAL DAILY
Qty: 30 CAPSULE | Refills: 6 | Status: SHIPPED | OUTPATIENT
Start: 2024-01-01 | End: 2024-01-01

## 2024-01-01 RX ORDER — HALOPERIDOL 5 MG/ML
5 INJECTION INTRAMUSCULAR EVERY 4 HOURS PRN
Status: DISCONTINUED | OUTPATIENT
Start: 2024-01-01 | End: 2024-01-01 | Stop reason: HOSPADM

## 2024-01-01 RX ORDER — ACETAMINOPHEN 160 MG/5ML
960 SUSPENSION ORAL 3 TIMES DAILY
Qty: 1350 ML | Refills: 23 | Status: SHIPPED | OUTPATIENT
Start: 2024-01-01 | End: 2024-01-01

## 2024-01-01 RX ORDER — ONDANSETRON 4 MG/1
8 TABLET, ORALLY DISINTEGRATING ORAL EVERY 8 HOURS PRN
Status: DISCONTINUED | OUTPATIENT
Start: 2024-01-01 | End: 2024-01-01 | Stop reason: HOSPADM

## 2024-01-01 RX ORDER — LACTULOSE 20 G/30ML
10 SOLUTION ORAL
Status: DISCONTINUED | OUTPATIENT
Start: 2024-01-01 | End: 2024-01-01 | Stop reason: HOSPADM

## 2024-01-01 RX ORDER — HALOPERIDOL 2 MG/ML
5 SOLUTION ORAL EVERY 4 HOURS PRN
Status: DISCONTINUED | OUTPATIENT
Start: 2024-01-01 | End: 2024-01-01 | Stop reason: HOSPADM

## 2024-01-01 RX ORDER — MORPHINE SULFATE 100 MG/5ML
20 SOLUTION ORAL
Status: DISCONTINUED | OUTPATIENT
Start: 2024-01-01 | End: 2024-01-01 | Stop reason: HOSPADM

## 2024-01-01 RX ORDER — ACETAMINOPHEN 325 MG/1
650 TABLET ORAL EVERY 4 HOURS PRN
Status: DISCONTINUED | OUTPATIENT
Start: 2024-01-01 | End: 2024-01-01 | Stop reason: HOSPADM

## 2024-01-01 RX ORDER — ACETAMINOPHEN 650 MG/1
650 SUPPOSITORY RECTAL EVERY 4 HOURS PRN
Status: DISCONTINUED | OUTPATIENT
Start: 2024-01-01 | End: 2024-01-01 | Stop reason: HOSPADM

## 2024-01-01 RX ORDER — MORPHINE SULFATE 100 MG/5ML
5 SOLUTION ORAL EVERY 4 HOURS
Status: DISCONTINUED | OUTPATIENT
Start: 2024-01-01 | End: 2024-01-01 | Stop reason: HOSPADM

## 2024-01-01 RX ORDER — GLYCOPYRROLATE 0.2 MG/ML
0.2 INJECTION INTRAMUSCULAR; INTRAVENOUS 3 TIMES DAILY PRN
Status: DISCONTINUED | OUTPATIENT
Start: 2024-01-01 | End: 2024-01-01 | Stop reason: HOSPADM

## 2024-01-01 RX ORDER — ONDANSETRON 2 MG/ML
8 INJECTION INTRAMUSCULAR; INTRAVENOUS EVERY 8 HOURS PRN
Status: DISCONTINUED | OUTPATIENT
Start: 2024-01-01 | End: 2024-01-01 | Stop reason: HOSPADM

## 2024-01-01 RX ORDER — CLONAZEPAM 2 MG/1
2 TABLET ORAL NIGHTLY
Qty: 90 TABLET | Refills: 0 | Status: SHIPPED | OUTPATIENT
Start: 2024-01-01 | End: 2024-01-01

## 2024-01-01 RX ORDER — LORAZEPAM 2 MG/ML
2 CONCENTRATE ORAL EVERY 4 HOURS
Status: DISCONTINUED | OUTPATIENT
Start: 2024-01-01 | End: 2024-01-01 | Stop reason: HOSPADM

## 2024-01-01 RX ORDER — MORPHINE SULFATE 100 MG/5ML
SOLUTION ORAL
Qty: 240 ML | Refills: 0 | Status: SHIPPED | OUTPATIENT
Start: 2024-01-01 | End: 2024-01-01

## 2024-01-01 RX ORDER — BISACODYL 10 MG
10 SUPPOSITORY, RECTAL RECTAL
Status: DISCONTINUED | OUTPATIENT
Start: 2024-01-01 | End: 2024-01-01 | Stop reason: HOSPADM

## 2024-01-01 RX ORDER — GLYCOPYRROLATE 1 MG/1
1 TABLET ORAL 3 TIMES DAILY PRN
Status: DISCONTINUED | OUTPATIENT
Start: 2024-01-01 | End: 2024-01-01 | Stop reason: HOSPADM

## 2024-01-01 RX ORDER — CLONAZEPAM 1 MG/1
1 TABLET ORAL NIGHTLY
Qty: 180 TABLET | Refills: 0 | Status: SHIPPED | OUTPATIENT
Start: 2024-01-01 | End: 2024-01-01 | Stop reason: SDUPTHER

## 2024-01-01 RX ADMIN — LORAZEPAM 2 MG: 2 LIQUID ORAL at 01:33

## 2024-01-01 RX ADMIN — MORPHINE SULFATE 5 MG: 100 SOLUTION ORAL at 01:07

## 2024-01-01 RX ADMIN — MORPHINE SULFATE 5 MG: 100 SOLUTION ORAL at 14:00

## 2024-01-01 RX ADMIN — MORPHINE SULFATE 5 MG: 100 SOLUTION ORAL at 10:15

## 2024-01-01 RX ADMIN — MORPHINE SULFATE 5 MG: 100 SOLUTION ORAL at 02:06

## 2024-01-01 RX ADMIN — LORAZEPAM 2 MG: 2 LIQUID ORAL at 01:56

## 2024-01-01 RX ADMIN — LORAZEPAM 2 MG: 2 LIQUID ORAL at 21:16

## 2024-01-01 RX ADMIN — LORAZEPAM 2 MG: 2 LIQUID ORAL at 17:28

## 2024-01-01 RX ADMIN — LORAZEPAM 2 MG: 2 LIQUID ORAL at 18:30

## 2024-01-01 RX ADMIN — MORPHINE SULFATE 5 MG: 100 SOLUTION ORAL at 14:38

## 2024-01-01 RX ADMIN — LORAZEPAM 2 MG: 2 LIQUID ORAL at 14:12

## 2024-01-01 RX ADMIN — MORPHINE SULFATE 5 MG: 100 SOLUTION ORAL at 15:04

## 2024-01-01 RX ADMIN — MORPHINE SULFATE 5 MG: 100 SOLUTION ORAL at 05:13

## 2024-01-01 RX ADMIN — LORAZEPAM 2 MG: 2 LIQUID ORAL at 10:24

## 2024-01-01 RX ADMIN — LORAZEPAM 2 MG: 2 LIQUID ORAL at 14:00

## 2024-01-01 RX ADMIN — MORPHINE SULFATE 5 MG: 100 SOLUTION ORAL at 10:11

## 2024-01-01 RX ADMIN — LORAZEPAM 2 MG: 2 LIQUID ORAL at 17:53

## 2024-01-01 RX ADMIN — MORPHINE SULFATE 5 MG: 100 SOLUTION ORAL at 22:06

## 2024-01-01 RX ADMIN — LORAZEPAM 2 MG: 2 LIQUID ORAL at 05:18

## 2024-01-01 RX ADMIN — LORAZEPAM 2 MG: 2 LIQUID ORAL at 14:38

## 2024-01-01 RX ADMIN — LORAZEPAM 2 MG: 2 LIQUID ORAL at 22:08

## 2024-01-01 RX ADMIN — MORPHINE SULFATE 5 MG: 100 SOLUTION ORAL at 21:16

## 2024-01-01 RX ADMIN — MORPHINE SULFATE 5 MG: 100 SOLUTION ORAL at 06:12

## 2024-01-01 RX ADMIN — MORPHINE SULFATE 5 MG: 100 SOLUTION ORAL at 05:58

## 2024-01-01 RX ADMIN — MORPHINE SULFATE 5 MG: 100 SOLUTION ORAL at 09:54

## 2024-01-01 RX ADMIN — MORPHINE SULFATE 5 MG: 100 SOLUTION ORAL at 18:00

## 2024-01-01 RX ADMIN — MORPHINE SULFATE 5 MG: 100 SOLUTION ORAL at 02:32

## 2024-01-01 RX ADMIN — MORPHINE SULFATE 5 MG: 100 SOLUTION ORAL at 01:33

## 2024-01-01 RX ADMIN — LORAZEPAM 2 MG: 2 LIQUID ORAL at 06:12

## 2024-01-01 RX ADMIN — MORPHINE SULFATE 5 MG: 100 SOLUTION ORAL at 17:28

## 2024-01-01 RX ADMIN — MORPHINE SULFATE 5 MG: 100 SOLUTION ORAL at 18:14

## 2024-01-01 RX ADMIN — MORPHINE SULFATE 5 MG: 100 SOLUTION ORAL at 14:12

## 2024-01-01 RX ADMIN — MORPHINE SULFATE 5 MG: 100 SOLUTION ORAL at 05:18

## 2024-01-01 RX ADMIN — MORPHINE SULFATE 5 MG: 100 SOLUTION ORAL at 22:36

## 2024-01-01 RX ADMIN — LORAZEPAM 2 MG: 2 LIQUID ORAL at 21:51

## 2024-01-01 RX ADMIN — LORAZEPAM 2 MG: 2 LIQUID ORAL at 18:15

## 2024-01-01 RX ADMIN — MORPHINE SULFATE 5 MG: 100 SOLUTION ORAL at 10:24

## 2024-01-01 RX ADMIN — LORAZEPAM 2 MG: 2 LIQUID ORAL at 22:36

## 2024-01-01 RX ADMIN — LORAZEPAM 2 MG: 2 LIQUID ORAL at 02:00

## 2024-01-01 RX ADMIN — LORAZEPAM 2 MG: 2 LIQUID ORAL at 09:54

## 2024-01-01 RX ADMIN — LORAZEPAM 2 MG: 2 LIQUID ORAL at 15:30

## 2024-01-01 RX ADMIN — MORPHINE SULFATE 5 MG: 100 SOLUTION ORAL at 21:30

## 2024-01-01 RX ADMIN — LORAZEPAM 2 MG: 2 LIQUID ORAL at 02:05

## 2024-01-01 RX ADMIN — LORAZEPAM 2 MG: 2 LIQUID ORAL at 21:30

## 2024-01-01 RX ADMIN — MORPHINE SULFATE 5 MG: 100 SOLUTION ORAL at 21:51

## 2024-01-01 RX ADMIN — MORPHINE SULFATE 5 MG: 100 SOLUTION ORAL at 15:30

## 2024-01-01 RX ADMIN — MORPHINE SULFATE 20 MG: 10 SOLUTION ORAL at 19:10

## 2024-01-01 RX ADMIN — LORAZEPAM 2 MG: 2 LIQUID ORAL at 05:58

## 2024-01-01 RX ADMIN — MORPHINE SULFATE 5 MG: 100 SOLUTION ORAL at 17:53

## 2024-01-01 RX ADMIN — LORAZEPAM 2 MG: 2 LIQUID ORAL at 05:06

## 2024-01-01 RX ADMIN — MORPHINE SULFATE 5 MG: 100 SOLUTION ORAL at 05:06

## 2024-01-01 RX ADMIN — MORPHINE SULFATE 5 MG: 100 SOLUTION ORAL at 01:56

## 2024-01-01 RX ADMIN — LORAZEPAM 2 MG: 2 LIQUID ORAL at 10:15

## 2024-01-01 RX ADMIN — MORPHINE SULFATE 5 MG: 100 SOLUTION ORAL at 09:49

## 2024-01-01 RX ADMIN — LORAZEPAM 2 MG: 2 LIQUID ORAL at 15:11

## 2024-01-01 RX ADMIN — LORAZEPAM 2 MG: 2 LIQUID ORAL at 10:11

## 2024-01-01 RX ADMIN — LORAZEPAM 2 MG: 2 LIQUID ORAL at 17:07

## 2024-01-01 RX ADMIN — LORAZEPAM 2 MG: 2 LIQUID ORAL at 01:06

## 2024-01-01 RX ADMIN — MORPHINE SULFATE 5 MG: 100 SOLUTION ORAL at 17:08

## 2024-01-01 RX ADMIN — LORAZEPAM 2 MG: 2 LIQUID ORAL at 09:49

## 2024-01-01 RX ADMIN — LORAZEPAM 2 MG: 2 LIQUID ORAL at 05:13

## 2024-01-01 ASSESSMENT — PAIN SCALES - PAIN ASSESSMENT IN ADVANCED DEMENTIA (PAINAD)
BREATHING: NOISY LABORED BREATHING, LONG PERIODS OF HYPERVENTILATION, CHEYNE-STOKES RESPIRATIONS
TOTALSCORE: 0
FACIALEXPRESSION: 2 - FACIAL GRIMACING.
TOTALSCORE: 0
TOTALSCORE: 2
FACIALEXPRESSION: 0 - SMILING OR INEXPRESSIVE.
FACIALEXPRESSION: 0 - SMILING OR INEXPRESSIVE.
CONSOLABILITY: 0 - NO NEED TO CONSOLE.
CONSOLABILITY: NO NEED TO CONSOLE
BODYLANGUAGE: RELAXED
BODYLANGUAGE: 0 - RELAXED.
BODYLANGUAGE: RELAXED
BREATHING: NOISY LABORED BREATHING, LONG PERIODS OF HYPERVENTILATION, CHEYNE-STOKES RESPIRATIONS
TOTALSCORE: 0
NEGVOCALIZATION: OCCASIONAL MOAN/GROAN, LOW SPEECH, NEGATIVE/DISAPPROVING QUALITY
CONSOLABILITY: 0 - NO NEED TO CONSOLE.
TOTALSCORE: 0
NEGVOCALIZATION: 0 - NONE.
BODYLANGUAGE: RELAXED
TOTALSCORE: 0
NEGVOCALIZATION: 0 - NONE.
TOTALSCORE: 0
FACIALEXPRESSION: 0 - SMILING OR INEXPRESSIVE.
NEGVOCALIZATION: 0 - NONE.
NEGVOCALIZATION: 0 - NONE.
BREATHING: NORMAL
CONSOLABILITY: NO NEED TO CONSOLE
BODYLANGUAGE: 0 - RELAXED.
CONSOLABILITY: 0 - NO NEED TO CONSOLE.
BODYLANGUAGE: 0 - RELAXED.
BODYLANGUAGE: RELAXED
BODYLANGUAGE: RELAXED
BODYLANGUAGE: 0 - RELAXED.
BREATHING: NOISY LABORED BREATHING, LONG PERIODS OF HYPERVENTILATION, CHEYNE-STOKES RESPIRATIONS
CONSOLABILITY: 0 - NO NEED TO CONSOLE.
CONSOLABILITY: NO NEED TO CONSOLE
TOTALSCORE: 2
CONSOLABILITY: 0 - NO NEED TO CONSOLE.
CONSOLABILITY: 0 - NO NEED TO CONSOLE.
BODYLANGUAGE: 0 - RELAXED.
CONSOLABILITY: 0 - NO NEED TO CONSOLE.
NEGVOCALIZATION: 0 - NONE.
FACIALEXPRESSION: 0 - SMILING OR INEXPRESSIVE.
BREATHING: NORMAL
BODYLANGUAGE: 0 - RELAXED.
FACIALEXPRESSION: SMILING OR INEXPRESSIVE
CONSOLABILITY: NO NEED TO CONSOLE
FACIALEXPRESSION: 1 - SAD. FRIGHTENED. FROWN.
CONSOLABILITY: 0 - NO NEED TO CONSOLE.
BREATHING: NORMAL
BODYLANGUAGE: 1 - TENSE. DISTRESSED PACING. FIDGETING.
TOTALSCORE: 5
TOTALSCORE: 2
NEGVOCALIZATION: 0 - NONE.
FACIALEXPRESSION: SMILING OR INEXPRESSIVE
BREATHING: NOISY LABORED BREATHING, LONG PERIODS OF HYPERVENTILATION, CHEYNE-STOKES RESPIRATIONS
TOTALSCORE: 0
FACIALEXPRESSION: 0 - SMILING OR INEXPRESSIVE.
CONSOLABILITY: DISTRACTED OR REASSURED BY VOICE/TOUCH
FACIALEXPRESSION: 0 - SMILING OR INEXPRESSIVE.
FACIALEXPRESSION: 0 - SMILING OR INEXPRESSIVE.
TOTALSCORE: 0
FACIALEXPRESSION: 0 - SMILING OR INEXPRESSIVE.
BODYLANGUAGE: 0 - RELAXED.
NEGVOCALIZATION: 0 - NONE.
TOTALSCORE: 0
TOTALSCORE: 0
CONSOLABILITY: 0 - NO NEED TO CONSOLE.
TOTALSCORE: 0
CONSOLABILITY: NO NEED TO CONSOLE
FACIALEXPRESSION: SMILING OR INEXPRESSIVE
FACIALEXPRESSION: SMILING OR INEXPRESSIVE
CONSOLABILITY: 0 - NO NEED TO CONSOLE.
FACIALEXPRESSION: 0 - SMILING OR INEXPRESSIVE.
BODYLANGUAGE: RELAXED
NEGVOCALIZATION: 0 - NONE.
CONSOLABILITY: NO NEED TO CONSOLE
BODYLANGUAGE: RELAXED
FACIALEXPRESSION: 0 - SMILING OR INEXPRESSIVE.
CONSOLABILITY: NO NEED TO CONSOLE
TOTALSCORE: 4
TOTALSCORE: 2
CONSOLABILITY: 0 - NO NEED TO CONSOLE.
NEGVOCALIZATION: 0 - NONE.
BODYLANGUAGE: 0 - RELAXED.
TOTALSCORE: 2
NEGVOCALIZATION: 0 - NONE.
FACIALEXPRESSION: 1 - SAD. FRIGHTENED. FROWN.
FACIALEXPRESSION: 0 - SMILING OR INEXPRESSIVE.
TOTALSCORE: 2
FACIALEXPRESSION: 0 - SMILING OR INEXPRESSIVE.
CONSOLABILITY: 0 - NO NEED TO CONSOLE.
NEGVOCALIZATION: 0 - NONE.
TOTALSCORE: 0
TOTALSCORE: 0
CONSOLABILITY: 0 - NO NEED TO CONSOLE.
NEGVOCALIZATION: 0 - NONE.
BODYLANGUAGE: 0 - RELAXED.
NEGVOCALIZATION: 0 - NONE.
FACIALEXPRESSION: SMILING OR INEXPRESSIVE
NEGVOCALIZATION: 0 - NONE.
CONSOLABILITY: 0 - NO NEED TO CONSOLE.
FACIALEXPRESSION: 0 - SMILING OR INEXPRESSIVE.
CONSOLABILITY: 0 - NO NEED TO CONSOLE.
TOTALSCORE: 0
FACIALEXPRESSION: SMILING OR INEXPRESSIVE
BODYLANGUAGE: 0 - RELAXED.
CONSOLABILITY: 0 - NO NEED TO CONSOLE.
CONSOLABILITY: 0 - NO NEED TO CONSOLE.
BODYLANGUAGE: RELAXED
FACIALEXPRESSION: FACIAL GRIMACING
FACIALEXPRESSION: SMILING OR INEXPRESSIVE
BODYLANGUAGE: 0 - RELAXED.
NEGVOCALIZATION: 0 - NONE.
TOTALSCORE: 2
NEGVOCALIZATION: 0 - NONE.
FACIALEXPRESSION: SMILING OR INEXPRESSIVE
BODYLANGUAGE: TENSE, DISTRESSED PACING, FIDGETING
CONSOLABILITY: 0 - NO NEED TO CONSOLE.
BREATHING: NOISY LABORED BREATHING, LONG PERIODS OF HYPERVENTILATION, CHEYNE-STOKES RESPIRATIONS
CONSOLABILITY: 1 - DISTRACTED OR REASSURED BY VOICE OR TOUCH.
BODYLANGUAGE: 0 - RELAXED.
NEGVOCALIZATION: 0 - NONE.
TOTALSCORE: 0
BODYLANGUAGE: 0 - RELAXED.
NEGVOCALIZATION: 0 - NONE.
BREATHING: NORMAL
FACIALEXPRESSION: 0 - SMILING OR INEXPRESSIVE.
TOTALSCORE: 0
BODYLANGUAGE: 0 - RELAXED.
FACIALEXPRESSION: 0 - SMILING OR INEXPRESSIVE.
NEGVOCALIZATION: 1 - OCCASIONAL MOAN OR GROAN. LOW-LEVEL SPEECH WITH A NEGATIVE OR DISAPPROVING QUALITY.
TOTALSCORE: 7
FACIALEXPRESSION: 0 - SMILING OR INEXPRESSIVE.
CONSOLABILITY: 0 - NO NEED TO CONSOLE.
FACIALEXPRESSION: 0 - SMILING OR INEXPRESSIVE.
NEGVOCALIZATION: 0 - NONE.
BODYLANGUAGE: 0 - RELAXED.
BODYLANGUAGE: 0 - RELAXED.
CONSOLABILITY: 0 - NO NEED TO CONSOLE.
BODYLANGUAGE: 0 - RELAXED.
TOTALSCORE: 1
BODYLANGUAGE: 0 - RELAXED.
CONSOLABILITY: NO NEED TO CONSOLE
FACIALEXPRESSION: 0 - SMILING OR INEXPRESSIVE.
NEGVOCALIZATION: 0 - NONE.
TOTALSCORE: 2

## 2024-01-01 ASSESSMENT — ENCOUNTER SYMPTOMS
STOOL FREQUENCY: LESS THAN DAILY
MUSCLE WEAKNESS: 1
LAST BOWEL MOVEMENT: 66889
FATIGUES EASILY: 1
INCREASED SLEEPING: 1
PERSON REPORTING PAIN: DIRECT OBSERVATION
PERSON REPORTING PAIN: DIRECT OBSERVATION
DENIES PAIN: 1
FATIGUES EASILY: 1
LAST BOWEL MOVEMENT: 66909
FATIGUE: 1
PERSON REPORTING PAIN: DIRECT OBSERVATION
LIMITED RANGE OF MOTION: 1
DENIES PAIN: 1
PERSON REPORTING PAIN: DIRECT OBSERVATION
BOWEL INCONTINENCE: 1
BOWEL INCONTINENCE: 1
STOOL FREQUENCY: LESS THAN DAILY
PERSON REPORTING PAIN: DIRECT OBSERVATION
FATIGUE: 1
DENIES PAIN: 1
MUSCLE WEAKNESS: 1
INCREASED SLEEPING: 1
FATIGUES EASILY: 1
PERSON REPORTING PAIN: DIRECT OBSERVATION
STOOL FREQUENCY: DAILY
FORGETFULNESS: 1
BOWEL INCONTINENCE: 1
DENIES PAIN: 1
FORGETFULNESS: 1
STOOL FREQUENCY: DAILY
INCREASED SLEEPING: 1
LAST BOWEL MOVEMENT: 66842
DENIES PAIN: 1
DENIES PAIN: 1
BOWEL INCONTINENCE: 1
MUSCLE WEAKNESS: 1
APNEIC BREATHING: 1
MUSCLE WEAKNESS: 1
INCREASED SLEEPING: 1
LIMITED RANGE OF MOTION: 1
SLEEP QUALITY: ADEQUATE
DENIES PAIN: 1
SLEEP QUALITY: ADEQUATE
STOOL FREQUENCY: DAILY
LIMITED RANGE OF MOTION: 1
MUSCLE WEAKNESS: 1
FATIGUE: 1
LAST BOWEL MOVEMENT: 66899
STOOL FREQUENCY: LESS THAN DAILY
LAST BOWEL MOVEMENT: 66882
BOWEL INCONTINENCE: 1
DENIES PAIN: 1
DESCRIPTION OF MEMORY LOSS: SHORT TERM
MUSCLE WEAKNESS: 1
BOWEL INCONTINENCE: 1
FATIGUE: 1
SLEEP QUALITY: ADEQUATE
APNEIC BREATHING: 1
LIMITED RANGE OF MOTION: 1
MUSCLE WEAKNESS: 1
LIMITED RANGE OF MOTION: 1
MUSCLE WEAKNESS: 1
PERSON REPORTING PAIN: DIRECT OBSERVATION
DECREASED ORAL INTAKE: 1
FORGETFULNESS: 1
PAIN: 1
FATIGUES EASILY: 1
LIMITED RANGE OF MOTION: 1
INCREASED SLEEPING: 1
LAST BOWEL MOVEMENT: 66855
FORGETFULNESS: 1
DENIES PAIN: 1
SLEEP QUALITY: ADEQUATE
DENIES PAIN: 1
DENIES PAIN: 1
SLEEP QUALITY: ADEQUATE
DENIES PAIN: 1
MUSCLE WEAKNESS: 1
INCREASED FATIGUE: 1
LAST BOWEL MOVEMENT: 66870
LIMITED RANGE OF MOTION: 1
LIMITED RANGE OF MOTION: 1
FORGETFULNESS: 1
BOWEL INCONTINENCE: 1
MUSCLE WEAKNESS: 1
PERSON REPORTING PAIN: DIRECT OBSERVATION
STOOL FREQUENCY: DAILY
LIMITED RANGE OF MOTION: 1
LAST BOWEL MOVEMENT: 66848
SLEEP QUALITY: ADEQUATE
LIMITED RANGE OF MOTION: 1
MUSCLE WEAKNESS: 1
DECREASED TO NO URINARY OUTPUT: 1
INCREASED FATIGUE: 1
FATIGUE: 1
PERSON REPORTING PAIN: DIRECT OBSERVATION
STOOL FREQUENCY: DAILY
STOOL FREQUENCY: LESS THAN DAILY
LAST BOWEL MOVEMENT: 66909
INCREASED FATIGUE: 1
PERSON REPORTING PAIN: DIRECT OBSERVATION
PERSON REPORTING PAIN: DIRECT OBSERVATION
DECREASED ORAL INTAKE: 1
FATIGUES EASILY: 1
FORGETFULNESS: 1
PERSON REPORTING PAIN: DIRECT OBSERVATION
PERSON REPORTING PAIN: DIRECT OBSERVATION
FATIGUES EASILY: 1
LIMITED RANGE OF MOTION: 1
SLEEP QUALITY: ADEQUATE
LAST BOWEL MOVEMENT: 66876
DECREASED ORAL INTAKE: 1
PERSON REPORTING PAIN: DIRECT OBSERVATION
FORGETFULNESS: 1
INCREASED FATIGUE: 1
BOWEL INCONTINENCE: 1
BOWEL INCONTINENCE: 1
DENIES PAIN: 1
MUSCLE WEAKNESS: 1
DENIES PAIN: 1
PERSON REPORTING PAIN: DIRECT OBSERVATION
DECREASED ORAL INTAKE: 1
SLEEP QUALITY: ADEQUATE
INCREASED FATIGUE: 1
PERSON REPORTING PAIN: DIRECT OBSERVATION
PAIN: 1
SLEEP QUALITY: ADEQUATE
LAST BOWEL MOVEMENT: 66905
LIMITED RANGE OF MOTION: 1
STOOL FREQUENCY: LESS THAN DAILY
PERSON REPORTING PAIN: DIRECT OBSERVATION
FORGETFULNESS: 1
FORGETFULNESS: 1
FATIGUE: 1
SLEEP QUALITY: ADEQUATE
FORGETFULNESS: 1
SLEEP QUALITY: ADEQUATE
FATIGUE: 1
STOOL FREQUENCY: LESS THAN DAILY
DENIES PAIN: 1
FATIGUE: 1
DECREASED TO NO URINARY OUTPUT: 1
DENIES PAIN: 1
FATIGUES EASILY: 1
FORGETFULNESS: 1
INCREASED FATIGUE: 1
STOOL FREQUENCY: LESS THAN DAILY
BOWEL INCONTINENCE: 1
BOWEL INCONTINENCE: 1
PERSON REPORTING PAIN: DIRECT OBSERVATION
DECREASED TO NO URINARY OUTPUT: 1
INCREASED SLEEPING: 1
FATIGUE: 1
PERSON REPORTING PAIN: DIRECT OBSERVATION
FATIGUE: 1
FATIGUES EASILY: 1
BOWEL INCONTINENCE: 1
INCREASED FATIGUE: 1
LIMITED RANGE OF MOTION: 1
SLEEP QUALITY: ADEQUATE
DECREASED TO NO URINARY OUTPUT: 1
INCREASED SLEEPING: 1
PERSON REPORTING PAIN: DIRECT OBSERVATION
DECREASED ORAL INTAKE: 1
DESCRIPTION OF MEMORY LOSS: LONG TERM
APNEIC BREATHING: 1
DENIES PAIN: 1
FATIGUES EASILY: 1
DENIES PAIN: 1
APNEIC BREATHING: 1
MUSCLE WEAKNESS: 1
LAST BOWEL MOVEMENT: 66862
DECREASED ORAL INTAKE: 1
APNEIC BREATHING: 1
PERSON REPORTING PAIN: DIRECT OBSERVATION

## 2024-01-01 ASSESSMENT — COGNITIVE AND FUNCTIONAL STATUS - GENERAL
WALKING IN HOSPITAL ROOM: TOTAL
SUGGESTED CMS G CODE MODIFIER MOBILITY: CN
EATING MEALS: TOTAL
MOBILITY SCORE: 6
MOVING TO AND FROM BED TO CHAIR: TOTAL
PERSONAL GROOMING: TOTAL
CLIMB 3 TO 5 STEPS WITH RAILING: TOTAL
HELP NEEDED FOR BATHING: TOTAL
DRESSING REGULAR UPPER BODY CLOTHING: TOTAL
DAILY ACTIVITIY SCORE: 6
TOILETING: TOTAL
TURNING FROM BACK TO SIDE WHILE IN FLAT BAD: TOTAL
MOVING FROM LYING ON BACK TO SITTING ON SIDE OF FLAT BED: TOTAL
SUGGESTED CMS G CODE MODIFIER DAILY ACTIVITY: CN
DRESSING REGULAR LOWER BODY CLOTHING: TOTAL
STANDING UP FROM CHAIR USING ARMS: TOTAL

## 2024-01-01 ASSESSMENT — LIFESTYLE VARIABLES
HAVE YOU EVER FELT YOU SHOULD CUT DOWN ON YOUR DRINKING: NO
TOTAL SCORE: 0
HOW MANY TIMES IN THE PAST YEAR HAVE YOU HAD 5 OR MORE DRINKS IN A DAY: 0
EVER HAD A DRINK FIRST THING IN THE MORNING TO STEADY YOUR NERVES TO GET RID OF A HANGOVER: NO
ON A TYPICAL DAY WHEN YOU DRINK ALCOHOL HOW MANY DRINKS DO YOU HAVE: 0
AVERAGE NUMBER OF DAYS PER WEEK YOU HAVE A DRINK CONTAINING ALCOHOL: 0
HAVE PEOPLE ANNOYED YOU BY CRITICIZING YOUR DRINKING: NO
EVER FELT BAD OR GUILTY ABOUT YOUR DRINKING: NO
DOES PATIENT WANT TO STOP DRINKING: CANNOT ASSESS
TOTAL SCORE: 0
ALCOHOL_USE: NO
TOTAL SCORE: 0
CONSUMPTION TOTAL: NEGATIVE

## 2024-01-01 ASSESSMENT — ACTIVITIES OF DAILY LIVING (ADL)
AMBULATION ASSISTANCE: STAND BY ASSIST
AMBULATION ASSISTANCE: ONE PERSON
AMBULATION ASSISTANCE: ONE PERSON
CURRENT_FUNCTION: ONE PERSON
CURRENT_FUNCTION: TWO PERSON
AMBULATION ASSISTANCE: STAND BY ASSIST
CURRENT_FUNCTION: STAND BY ASSIST
CURRENT_FUNCTION: TWO PERSON
CURRENT_FUNCTION: ONE PERSON
AMBULATION ASSISTANCE: STAND BY ASSIST
AMBULATION ASSISTANCE: ONE PERSON
CURRENT_FUNCTION: STAND BY ASSIST
CURRENT_FUNCTION: ONE PERSON
AMBULATION ASSISTANCE: NON-AMBULATORY
CURRENT_FUNCTION: ONE PERSON
CURRENT_FUNCTION: ONE PERSON
AMBULATION ASSISTANCE: ONE PERSON
CURRENT_FUNCTION: STAND BY ASSIST
CURRENT_FUNCTION: ONE PERSON
AMBULATION ASSISTANCE: NON-AMBULATORY
CURRENT_FUNCTION: ONE PERSON
AMBULATION ASSISTANCE: ONE PERSON
CURRENT_FUNCTION: STAND BY ASSIST
AMBULATION ASSISTANCE: STAND BY ASSIST

## 2024-01-01 ASSESSMENT — SOCIAL DETERMINANTS OF HEALTH (SDOH)
ACTIVE STRESSOR - NO STRESS FACTORS: 1
ACTIVE STRESSOR - EXPRESSED EMOTIONAL NEED: 1
ACTIVE STRESSOR - NO STRESS FACTORS: 1

## 2024-01-01 ASSESSMENT — PAIN DESCRIPTION - PAIN TYPE
TYPE: ACUTE PAIN
TYPE: CHRONIC PAIN
TYPE: ACUTE PAIN
TYPE: CHRONIC PAIN
TYPE: CHRONIC PAIN
TYPE: ACUTE PAIN

## 2024-01-01 ASSESSMENT — PAIN SCALES - WONG BAKER
WONGBAKER_NUMERICALRESPONSE: DOESN'T HURT AT ALL
WONGBAKER_NUMERICALRESPONSE: DOESN'T HURT AT ALL

## 2024-02-22 NOTE — TELEPHONE ENCOUNTER
Pt is wondering if she may need a bipap since she has a hard time exhaling.    I advised her  we would ne a titration for that but they will go ahead and try the pressure change and adjust FLEX setting but I need clarification on the pressure.  8/4 was the last response I got??   show

## 2024-02-28 NOTE — PROGRESS NOTES
Patient visit made with Rochelle Dunaway RN for F2F. Patient having increasing agitation at night, possible nightmares. DC trazodone at this time, new order for scheduled tylenol liquid, increase in clonazepam to 1 mg at bedtime. For delivery today.

## 2024-03-15 PROBLEM — G31.1 SENILE DEGENERATION OF BRAIN (HCC): Status: ACTIVE | Noted: 2024-01-01

## 2024-03-15 NOTE — CARE PLAN
The patient is Stable - Low risk of patient condition declining or worsening    Shift Goals  Clinical Goals: Comfort  Patient Goals: DARLIN  Family Goals: comfort    Progress made toward(s) clinical / shift goals:  appears comfortable all through out the shift    Patient is not progressing towards the following goals:

## 2024-03-15 NOTE — PROGRESS NOTES
Assumed care of pt at 1530. Received report from RN, Navid. I agree with assessments and plan of care.   Patient is comfortable, with fall precautions in place. Bed in low, locked position, bed alarm and strip alarm in place.    Hourly rounding in progress.

## 2024-03-15 NOTE — H&P
HOSPICE RESPITE CARE H&P  Admitting physician:  PEDRO Gaytan     Reason for Admission: 5-day hospice respite     Date & Time note created:    3/15/2024   9:16 AM       History of Present Illness:    Mindy Carvajal is 73 y.o. female with senile degeneration of the brain. She resides in her home with primary caregiver/ Andry Carvajal. Recently she has been declining; multiple falls, agitation, poor sleep. She has periods of time where she doesn't eat for 24 + hours, sleeping throughout the day. She is no longer able to take tablets.  has been medicating her with morphine 5 mg every 4 hours and lorazepam 2 mg every 4 hours. She is admitted today for 5-night caregiver respite stay.           Past Medical History:   Past Medical History:   Diagnosis Date    Dyslipidemia     Hypothyroid     Primary progressive aphasia (HCC)     Sleep apnea        Past Surgical History:  Past Surgical History:   Procedure Laterality Date    PRIMARY C SECTION         Current Outpatient Medications:  Home Medications    **Home medications have not yet been reviewed for this encounter**         Medication Allergy/Sensitivities:  No Known Allergies    Family History:  Family History   Problem Relation Age of Onset    Cancer Mother         leukemia    Cancer Father         colon, age 80       Social History:  Social History     Tobacco Use    Smoking status: Never    Smokeless tobacco: Never    Tobacco comments:     30 years ago   Substance Use Topics    Alcohol use: Yes     Alcohol/week: 0.0 oz     Comment: occasionally    Drug use: No         Vitals:  Weight/BMI: There is no height or weight on file to calculate BMI.  There were no vitals taken for this visit.  There were no vitals filed for this visit.  Oxygen Therapy:         Lab Data Review:  No results found for this or any previous visit (from the past 24 hour(s)).    Imaging/Procedures Review:    No orders to display          Code Status:  DNR/DNI      Discussion: This patient is being admitted to [facility] for 5-day respite stay.  Continue current Hospice POC including home medications.      DANILO Gaytan.  Hospice and Palliative Medicine  03/15/24

## 2024-03-15 NOTE — PROGRESS NOTES
Patient admitted to room from home brought in by JOE. Open eyes to voice. Unable to get any information. Safety and falls precaution in place. Appears comfortable at this time. Call light placed within reach. Every 2 hour turns in place.

## 2024-03-16 NOTE — CARE PLAN
The patient is Stable - Low risk of patient condition declining or worsening    Shift Goals  Clinical Goals: Pt will remain comfortably in bed, pain free  Patient Goals: DARLIN  Family Goals: comfort    Progress made toward(s) clinical / shift goals:  Pt is resting in bed comfortably. Given scheduled medications for comfort. Repositioning every 2 hours using TAPS system.    Patient is not progressing towards the following goals:      Problem: Fall Risk  Goal: Patient will remain free from falls  Outcome: Progressing     Problem: Skin Integrity  Goal: Skin integrity is maintained or improved  Outcome: Progressing     Problem: Psychosocial - Comfort Care  Goal: Spiritual and cultural needs incorporated into hospitalization  Outcome: Progressing  Goal: Patient's level of anxiety will decrease  Outcome: Progressing     Problem: Respiratory - Comfort Care  Goal: Patient's respiratory function will be supported  Outcome: Progressing

## 2024-03-16 NOTE — PROGRESS NOTES
Received bedside report from night shift RN.  Assumed pt care.   Assessment and chart check complete.  Pt is resting in bed comfortably, respond to touch, open eyes to voice, aphasic, respirations even and unlabored, no signs of distress.  Fall precautions in place, treaded socks on pt, bed in low position, bed alarm on.   Hourly rounding.

## 2024-03-16 NOTE — CARE PLAN
The patient is Stable - Low risk of patient condition declining or worsening    Shift Goals  Clinical Goals: Pt will not sustain any new skin breakdown this shift. pt will not sustain a fall this shift.  Patient Goals: DARLIN  Family Goals: comfort    Progress made toward(s) clinical / shift goals:      Pt q2 turned this shift with TAPs. Skin preventative measures in place. No new skin breakdown noted.    Problem: Pain - Standard  Goal: Alleviation of pain or a reduction in pain to the patient’s comfort goal  Outcome: Progressing     Problem: Fall Risk  Goal: Patient will remain free from falls  Outcome: Progressing     Problem: Skin Integrity  Goal: Skin integrity is maintained or improved  Outcome: Progressing       Patient is not progressing towards the following goals:

## 2024-03-16 NOTE — PROGRESS NOTES
4 Eyes Skin Assessment Completed by RICHARD Lewis and RICHARD Elizabeth.    Head WDL  Ears WDL  Nose WDL  Mouth WDL  Neck WDL  Breast/Chest WDL  Shoulder Blades WDL  Spine WDL  (R) Arm/Elbow/Hand WDL  (L) Arm/Elbow/Hand WDL  Abdomen WDL  Groin Redness and Blanching  Scrotum/Coccyx/Buttocks Redness and Blanching  (R) Leg WDL  (L) Leg WDL  (R) Heel/Foot/Toe WDL  (L) Heel/Foot/Toe WDL          Devices In Places Blood Pressure Cuff and Pulse Ox      Interventions In Place Waffle Overlay, TAP System, Heels Loaded W/Pillows, and Pressure Redistribution Mattress    Possible Skin Injury No    Pictures Uploaded Into Epic N/A  Wound Consult Placed N/A  RN Wound Prevention Protocol Ordered No

## 2024-03-16 NOTE — PROGRESS NOTES
Pt is resting in bed. Respirations are even and unlabored. No objective signs of pain or n/v at this time. RN conducted 2 RN skin check and placed pt on TAPS wedges. Purwick in place. Pt awakes to touch but does not answer any questions. RN positioned pt for comfort. High fall precautions in place.

## 2024-03-16 NOTE — PROGRESS NOTES
4 Eyes Skin Assessment Completed by RICHARD Diaz and RICHARD Antoine.     Head WDL  Ears WDL  Nose WDL  Mouth WDL  Neck WDL  Breast/Chest WDL  Shoulder Blades WDL  Spine WDL  (R) Arm/Elbow/Hand WDL  (L) Arm/Elbow/Hand WDL  Abdomen Bruising  Groin Redness and Blanching  Scrotum/Coccyx/Buttocks Redness and Blanching  (R) Leg WDL  (L) Leg Bruising  (R) Heel/Foot/Toe WDL  (L) Heel/Foot/Toe WDL              Devices In Places Blood Pressure Cuff and Pulse Ox        Interventions In Place Waffle Overlay, TAP System, Mepilex heels, repositioning every 2 hours  Possible Skin Injury No     Pictures Uploaded Into Epic N/A  Wound Consult Placed N/A  RN Wound Prevention Protocol Ordered No

## 2024-03-17 NOTE — PROGRESS NOTES
Patient discharged per MD order via wheelchair with personal belongings. Pt is in stable condition. Home medications have been sent to preferred pharmacy. Discharge instructions have been reviewed and signed; with copy provided to patient. Any questions have been answered and patient verbalizes understanding of education and instructions provided.

## 2024-03-17 NOTE — CARE PLAN
The patient is Stable - Low risk of patient condition declining or worsening    Shift Goals  Clinical Goals: pt will not sustain any new skin breakdown this shift.  Patient Goals: DARLIN  Family Goals: comfort    Progress made toward(s) clinical / shift goals:      Pt Q2 turned using TAPs system on waffle overlay. No skin breakdown noted this shift.    Problem: Pain - Standard  Goal: Alleviation of pain or a reduction in pain to the patient’s comfort goal  Outcome: Progressing     Problem: Fall Risk  Goal: Patient will remain free from falls  Outcome: Progressing     Problem: Skin Integrity  Goal: Skin integrity is maintained or improved  Outcome: Progressing           Patient is not progressing towards the following goals:

## 2024-03-17 NOTE — PROGRESS NOTES
Received report from night shift nurse. Assumed pt care at 0715. Pt is resting comfortably in bed. Pt on RA. No signs of SOB/respiratory distress. Labs, VS, medications reviewed.  Fall precautions in place. Bed at lowest position. Call light and personal belongings within reach. Plan of care discussed, no further concerns at this time.

## 2024-03-18 NOTE — CARE PLAN
The patient is Stable - Low risk of patient condition declining or worsening    Shift Goals  Clinical Goals: Skin integrity will not deteriorate; Pt will display relaxed facial expression and body language.  Patient Goals: DARLIN  Family Goals: Comfort.    Progress made toward(s) clinical / shift goals:  Skin integrity remains intact; Pt with display of relaxed facial expression and body language after administration of comfort orders    Patient is not progressing towards the following goals:

## 2024-03-18 NOTE — PROGRESS NOTES
Pt is awake in bed. Pt is non-verbal apart from occasional groans. Pt is exhibiting increased work of breathing. Medicated per MAR for pain and repositioned pt. High fall precautions in place.

## 2024-03-18 NOTE — CARE PLAN
The patient is Stable - Low risk of patient condition declining or worsening    Shift Goals  Clinical Goals: pt will not sustain any new skin breakdown this shift.  Patient Goals: DARLIN  Family Goals: Comfort.    Progress made toward(s) clinical / shift goals:      Pt placed on waffle overlay and q2 turned using TAPs system. No new skin breakdown noted.    Problem: Pain - Standard  Goal: Alleviation of pain or a reduction in pain to the patient’s comfort goal  Outcome: Progressing     Problem: Fall Risk  Goal: Patient will remain free from falls  Outcome: Progressing     Problem: Skin Integrity  Goal: Skin integrity is maintained or improved  Outcome: Progressing     Problem: Respiratory - Comfort Care  Goal: Patient's respiratory function will be supported  Outcome: Progressing       Patient is not progressing towards the following goals:

## 2024-03-18 NOTE — PROGRESS NOTES
Received report from night shift nurse. Assumed pt care at 0715. Pt is  resting comfortably in bed. Pt on RA. Breathing is shallow and bradypnic. Labs, VS, medications reviewed.  Fall precautions in place. Bed at lowest position. Q 2 turns in place. Plan of care discussed, no further concerns at this time.

## 2024-03-19 NOTE — CARE PLAN
The patient is Stable - Low risk of patient condition declining or worsening    Shift Goals  Clinical Goals: comfort care, [id number]  Patient Goals: sherman  Family Goals: Comfort for pt.    Progress made toward(s) clinical / shift goals:  comfort care measures being exercised, [id number] being followed.    Patient is not progressing towards the following goals: n/a

## 2024-03-19 NOTE — CARE PLAN
The patient is Watcher - Medium risk of patient condition declining or worsening    Shift Goals  Clinical Goals: Pt will remain comfortable with scheduled medication and repositioning  Patient Goals: DARLIN      Progress made toward(s) clinical / shift goals:  Pt remained comfortable without signs of distress with repositioning and scheduled medications.     Patient is not progressing towards the following goals:

## 2024-03-19 NOTE — PROGRESS NOTES
0700 - Bedside report received from RICHARD Sevilla. Comfort care patient. Respirations observed. Appears to not be in distress. Hourly rounding in place.

## 2024-03-19 NOTE — PROGRESS NOTES
Received report from day shift nurse. Assumed pt care. Pt is resting comfortably in bed. Breaths are shallow and bradypnic. Fall precautions and Q2 turns in place. No further needs at this time.

## 2024-03-20 NOTE — PROGRESS NOTES
Received report from day shift nurse. Assumed pt care. Pt is sleeping comfortably in bed. No signs of SOB/respiratory distress, no non verbal signs of pain or distress. Fall precautions in place. Bed at lowest position. Call light and personal belongings within reach. Nurses station close to patients room. No further needs at this time.

## 2024-03-20 NOTE — CARE PLAN
The patient is Stable - Low risk of patient condition declining or worsening    Shift Goals  Clinical Goals: comfort care, [id number]  Patient Goals: sherman  Family Goals: Comfort for pt.    Progress made toward(s) clinical / shift goals:  comfort care measures being followed as well as [id number].    Patient is not progressing towards the following goals: n/a

## 2024-03-20 NOTE — CARE PLAN
The patient is Stable - Low risk of patient condition declining or worsening    Shift Goals  Clinical Goals: Comfort care  Patient Goals: sherman  Family Goals: Comfort for pt.    Progress made toward(s) clinical / shift goals:  Pt was kept comfortable without signs of distress throughout shift with repositioning and scheduled medications.     Patient is not progressing towards the following goals:

## 2024-03-21 NOTE — CASE COMMUNICATION
Recieved message 3/20, Mindy clothes did not return home with Mindy from Liam diaz. Contacted charge nurse.  On 3/21 followed up with [place] diaz- her belongings were located at the unit desk 2nd floor [facility].  ADILSON Herman to  belongings and deliver to spouse.  t/c to Andry and left message on voicemail that Mindy's belongings were found and MSW will bring them to their house.